# Patient Record
Sex: FEMALE | Race: WHITE | NOT HISPANIC OR LATINO | Employment: OTHER
[De-identification: names, ages, dates, MRNs, and addresses within clinical notes are randomized per-mention and may not be internally consistent; named-entity substitution may affect disease eponyms.]

---

## 2017-05-09 ENCOUNTER — RECORDS - HEALTHEAST (OUTPATIENT)
Dept: ADMINISTRATIVE | Facility: OTHER | Age: 72
End: 2017-05-09

## 2017-05-12 ENCOUNTER — OFFICE VISIT - HEALTHEAST (OUTPATIENT)
Dept: FAMILY MEDICINE | Facility: CLINIC | Age: 72
End: 2017-05-12

## 2017-05-12 DIAGNOSIS — M25.561 CHRONIC PAIN OF RIGHT KNEE: ICD-10-CM

## 2017-05-12 DIAGNOSIS — Z01.818 PREOPERATIVE EXAMINATION: ICD-10-CM

## 2017-05-12 DIAGNOSIS — G89.29 CHRONIC PAIN OF RIGHT KNEE: ICD-10-CM

## 2017-05-12 DIAGNOSIS — Z00.00 HEALTHCARE MAINTENANCE: ICD-10-CM

## 2017-05-12 LAB
ATRIAL RATE - MUSE: 80 BPM
CHOLEST SERPL-MCNC: 228 MG/DL
DIASTOLIC BLOOD PRESSURE - MUSE: NORMAL MMHG
FASTING STATUS PATIENT QL REPORTED: YES
HDLC SERPL-MCNC: 65 MG/DL
INTERPRETATION ECG - MUSE: NORMAL
LDLC SERPL CALC-MCNC: 142 MG/DL
P AXIS - MUSE: 41 DEGREES
PR INTERVAL - MUSE: 216 MS
QRS DURATION - MUSE: 84 MS
QT - MUSE: 396 MS
QTC - MUSE: 456 MS
R AXIS - MUSE: 7 DEGREES
SYSTOLIC BLOOD PRESSURE - MUSE: NORMAL MMHG
T AXIS - MUSE: 31 DEGREES
TRIGL SERPL-MCNC: 105 MG/DL
VENTRICULAR RATE- MUSE: 80 BPM

## 2017-05-12 ASSESSMENT — MIFFLIN-ST. JEOR: SCORE: 1086.89

## 2017-05-16 ENCOUNTER — COMMUNICATION - HEALTHEAST (OUTPATIENT)
Dept: FAMILY MEDICINE | Facility: CLINIC | Age: 72
End: 2017-05-16

## 2017-05-17 ENCOUNTER — COMMUNICATION - HEALTHEAST (OUTPATIENT)
Dept: FAMILY MEDICINE | Facility: CLINIC | Age: 72
End: 2017-05-17

## 2017-05-17 DIAGNOSIS — E78.49 OTHER HYPERLIPIDEMIA: ICD-10-CM

## 2017-05-31 ENCOUNTER — COMMUNICATION - HEALTHEAST (OUTPATIENT)
Dept: FAMILY MEDICINE | Facility: CLINIC | Age: 72
End: 2017-05-31

## 2017-06-03 ENCOUNTER — ANESTHESIA - HEALTHEAST (OUTPATIENT)
Dept: SURGERY | Facility: CLINIC | Age: 72
End: 2017-06-03

## 2017-06-05 ENCOUNTER — SURGERY - HEALTHEAST (OUTPATIENT)
Dept: SURGERY | Facility: CLINIC | Age: 72
End: 2017-06-05

## 2017-06-05 ASSESSMENT — MIFFLIN-ST. JEOR
SCORE: 1084.63
SCORE: 1082.36

## 2017-06-08 ENCOUNTER — COMMUNICATION - HEALTHEAST (OUTPATIENT)
Dept: FAMILY MEDICINE | Facility: CLINIC | Age: 72
End: 2017-06-08

## 2017-06-09 ENCOUNTER — OFFICE VISIT - HEALTHEAST (OUTPATIENT)
Dept: GERIATRICS | Facility: CLINIC | Age: 72
End: 2017-06-09

## 2017-06-09 DIAGNOSIS — M19.90 DEGENERATIVE JOINT DISEASE: ICD-10-CM

## 2017-06-09 DIAGNOSIS — Z96.651 HISTORY OF TOTAL KNEE ARTHROPLASTY, RIGHT: ICD-10-CM

## 2017-06-09 DIAGNOSIS — R52 PAIN MANAGEMENT: ICD-10-CM

## 2017-06-12 ENCOUNTER — AMBULATORY - HEALTHEAST (OUTPATIENT)
Dept: GERIATRICS | Facility: CLINIC | Age: 72
End: 2017-06-12

## 2017-06-12 ENCOUNTER — RECORDS - HEALTHEAST (OUTPATIENT)
Dept: ADMINISTRATIVE | Facility: OTHER | Age: 72
End: 2017-06-12

## 2017-06-13 ENCOUNTER — COMMUNICATION - HEALTHEAST (OUTPATIENT)
Dept: GERIATRICS | Facility: CLINIC | Age: 72
End: 2017-06-13

## 2017-06-13 ENCOUNTER — COMMUNICATION - HEALTHEAST (OUTPATIENT)
Dept: FAMILY MEDICINE | Facility: CLINIC | Age: 72
End: 2017-06-13

## 2017-06-13 DIAGNOSIS — M17.11 PRIMARY OSTEOARTHRITIS OF RIGHT KNEE: ICD-10-CM

## 2017-06-20 ENCOUNTER — RECORDS - HEALTHEAST (OUTPATIENT)
Dept: ADMINISTRATIVE | Facility: OTHER | Age: 72
End: 2017-06-20

## 2017-06-29 ENCOUNTER — COMMUNICATION - HEALTHEAST (OUTPATIENT)
Dept: FAMILY MEDICINE | Facility: CLINIC | Age: 72
End: 2017-06-29

## 2017-07-18 ENCOUNTER — RECORDS - HEALTHEAST (OUTPATIENT)
Dept: ADMINISTRATIVE | Facility: OTHER | Age: 72
End: 2017-07-18

## 2017-08-09 ENCOUNTER — RECORDS - HEALTHEAST (OUTPATIENT)
Dept: ADMINISTRATIVE | Facility: OTHER | Age: 72
End: 2017-08-09

## 2017-09-01 ENCOUNTER — OFFICE VISIT - HEALTHEAST (OUTPATIENT)
Dept: FAMILY MEDICINE | Facility: CLINIC | Age: 72
End: 2017-09-01

## 2017-09-01 DIAGNOSIS — E78.5 HYPERLIPIDEMIA: ICD-10-CM

## 2017-09-01 DIAGNOSIS — H69.90 EUSTACHIAN TUBE DYSFUNCTION: ICD-10-CM

## 2017-09-01 LAB
CHOLEST SERPL-MCNC: 229 MG/DL
FASTING STATUS PATIENT QL REPORTED: YES
HDLC SERPL-MCNC: 82 MG/DL
LDLC SERPL CALC-MCNC: 129 MG/DL
TRIGL SERPL-MCNC: 89 MG/DL

## 2017-09-05 ENCOUNTER — COMMUNICATION - HEALTHEAST (OUTPATIENT)
Dept: FAMILY MEDICINE | Facility: CLINIC | Age: 72
End: 2017-09-05

## 2017-09-08 ENCOUNTER — RECORDS - HEALTHEAST (OUTPATIENT)
Dept: ADMINISTRATIVE | Facility: OTHER | Age: 72
End: 2017-09-08

## 2017-09-29 ENCOUNTER — HOSPITAL ENCOUNTER (OUTPATIENT)
Dept: MAMMOGRAPHY | Facility: HOSPITAL | Age: 72
Discharge: HOME | End: 2017-09-29
Attending: FAMILY MEDICINE

## 2017-09-29 DIAGNOSIS — Z12.31 VISIT FOR SCREENING MAMMOGRAM: ICD-10-CM

## 2017-10-02 ENCOUNTER — HOSPITAL ENCOUNTER (OUTPATIENT)
Dept: MAMMOGRAPHY | Facility: HOSPITAL | Age: 72
Discharge: HOME | End: 2017-10-02
Attending: FAMILY MEDICINE

## 2017-10-02 DIAGNOSIS — N64.89 BREAST ASYMMETRY: ICD-10-CM

## 2018-02-21 ENCOUNTER — RECORDS - HEALTHEAST (OUTPATIENT)
Dept: ADMINISTRATIVE | Facility: OTHER | Age: 73
End: 2018-02-21

## 2018-02-22 ENCOUNTER — RECORDS - HEALTHEAST (OUTPATIENT)
Dept: ADMINISTRATIVE | Facility: OTHER | Age: 73
End: 2018-02-22

## 2018-02-27 ENCOUNTER — RECORDS - HEALTHEAST (OUTPATIENT)
Dept: ADMINISTRATIVE | Facility: OTHER | Age: 73
End: 2018-02-27

## 2018-03-25 ENCOUNTER — RECORDS - HEALTHEAST (OUTPATIENT)
Dept: ADMINISTRATIVE | Facility: OTHER | Age: 73
End: 2018-03-25

## 2018-03-28 ENCOUNTER — COMMUNICATION - HEALTHEAST (OUTPATIENT)
Dept: FAMILY MEDICINE | Facility: CLINIC | Age: 73
End: 2018-03-28

## 2018-03-28 DIAGNOSIS — I82.409 DVT (DEEP VENOUS THROMBOSIS) (H): ICD-10-CM

## 2018-05-08 ENCOUNTER — RECORDS - HEALTHEAST (OUTPATIENT)
Dept: ADMINISTRATIVE | Facility: OTHER | Age: 73
End: 2018-05-08

## 2018-05-18 ENCOUNTER — RECORDS - HEALTHEAST (OUTPATIENT)
Dept: ADMINISTRATIVE | Facility: OTHER | Age: 73
End: 2018-05-18

## 2018-05-23 ENCOUNTER — RECORDS - HEALTHEAST (OUTPATIENT)
Dept: ADMINISTRATIVE | Facility: OTHER | Age: 73
End: 2018-05-23

## 2018-05-30 ENCOUNTER — RECORDS - HEALTHEAST (OUTPATIENT)
Dept: ADMINISTRATIVE | Facility: OTHER | Age: 73
End: 2018-05-30

## 2018-06-04 ENCOUNTER — RECORDS - HEALTHEAST (OUTPATIENT)
Dept: ADMINISTRATIVE | Facility: OTHER | Age: 73
End: 2018-06-04

## 2018-07-27 ENCOUNTER — RECORDS - HEALTHEAST (OUTPATIENT)
Dept: ADMINISTRATIVE | Facility: OTHER | Age: 73
End: 2018-07-27

## 2018-07-30 ENCOUNTER — RECORDS - HEALTHEAST (OUTPATIENT)
Dept: ADMINISTRATIVE | Facility: OTHER | Age: 73
End: 2018-07-30

## 2018-09-19 ENCOUNTER — RECORDS - HEALTHEAST (OUTPATIENT)
Dept: ADMINISTRATIVE | Facility: OTHER | Age: 73
End: 2018-09-19

## 2018-09-27 ENCOUNTER — OFFICE VISIT - HEALTHEAST (OUTPATIENT)
Dept: RHEUMATOLOGY | Facility: CLINIC | Age: 73
End: 2018-09-27

## 2018-09-27 DIAGNOSIS — M15.0 PRIMARY GENERALIZED (OSTEO)ARTHRITIS: ICD-10-CM

## 2018-09-27 DIAGNOSIS — M25.511 CHRONIC RIGHT SHOULDER PAIN: ICD-10-CM

## 2018-09-27 DIAGNOSIS — G89.29 CHRONIC RIGHT SHOULDER PAIN: ICD-10-CM

## 2018-09-27 DIAGNOSIS — M15.0 PRIMARY OSTEOARTHRITIS INVOLVING MULTIPLE JOINTS: ICD-10-CM

## 2018-09-27 ASSESSMENT — MIFFLIN-ST. JEOR: SCORE: 1089.17

## 2018-10-01 ENCOUNTER — RECORDS - HEALTHEAST (OUTPATIENT)
Dept: ADMINISTRATIVE | Facility: OTHER | Age: 73
End: 2018-10-01

## 2018-10-10 ENCOUNTER — HOSPITAL ENCOUNTER (OUTPATIENT)
Dept: MAMMOGRAPHY | Facility: CLINIC | Age: 73
Discharge: HOME | End: 2018-10-10

## 2018-10-10 DIAGNOSIS — Z12.31 VISIT FOR SCREENING MAMMOGRAM: ICD-10-CM

## 2018-10-22 ENCOUNTER — COMMUNICATION - HEALTHEAST (OUTPATIENT)
Dept: ADMINISTRATIVE | Facility: CLINIC | Age: 73
End: 2018-10-22

## 2018-10-23 ENCOUNTER — OFFICE VISIT - HEALTHEAST (OUTPATIENT)
Dept: RHEUMATOLOGY | Facility: CLINIC | Age: 73
End: 2018-10-23

## 2018-10-23 DIAGNOSIS — G89.29 CHRONIC RIGHT SHOULDER PAIN: ICD-10-CM

## 2018-10-23 DIAGNOSIS — M75.81 ROTATOR CUFF TENDINITIS, RIGHT: ICD-10-CM

## 2018-10-23 DIAGNOSIS — M25.511 CHRONIC RIGHT SHOULDER PAIN: ICD-10-CM

## 2019-05-20 ENCOUNTER — OFFICE VISIT - HEALTHEAST (OUTPATIENT)
Dept: RHEUMATOLOGY | Facility: CLINIC | Age: 74
End: 2019-05-20

## 2019-05-20 DIAGNOSIS — M75.21 TENDONITIS OF LONG HEAD OF BICEPS BRACHII OF RIGHT SHOULDER: ICD-10-CM

## 2019-05-20 DIAGNOSIS — M25.511 ACUTE PAIN OF RIGHT SHOULDER: ICD-10-CM

## 2019-08-28 ENCOUNTER — RECORDS - HEALTHEAST (OUTPATIENT)
Dept: ADMINISTRATIVE | Facility: OTHER | Age: 74
End: 2019-08-28

## 2019-09-06 ENCOUNTER — RECORDS - HEALTHEAST (OUTPATIENT)
Dept: ADMINISTRATIVE | Facility: OTHER | Age: 74
End: 2019-09-06

## 2019-09-26 ENCOUNTER — RECORDS - HEALTHEAST (OUTPATIENT)
Dept: ADMINISTRATIVE | Facility: OTHER | Age: 74
End: 2019-09-26

## 2019-10-17 ENCOUNTER — RECORDS - HEALTHEAST (OUTPATIENT)
Dept: ADMINISTRATIVE | Facility: OTHER | Age: 74
End: 2019-10-17

## 2020-06-03 ENCOUNTER — OFFICE VISIT - HEALTHEAST (OUTPATIENT)
Dept: FAMILY MEDICINE | Facility: CLINIC | Age: 75
End: 2020-06-03

## 2020-06-03 DIAGNOSIS — Z12.31 VISIT FOR SCREENING MAMMOGRAM: ICD-10-CM

## 2020-06-03 DIAGNOSIS — I10 BENIGN ESSENTIAL HYPERTENSION: ICD-10-CM

## 2020-06-03 DIAGNOSIS — M94.9 DISORDER OF BONE AND CARTILAGE: ICD-10-CM

## 2020-06-03 DIAGNOSIS — M89.9 DISORDER OF BONE AND CARTILAGE: ICD-10-CM

## 2020-06-03 DIAGNOSIS — E78.00 PURE HYPERCHOLESTEROLEMIA: ICD-10-CM

## 2020-06-03 DIAGNOSIS — Z78.0 MENOPAUSE: ICD-10-CM

## 2020-06-08 ENCOUNTER — AMBULATORY - HEALTHEAST (OUTPATIENT)
Dept: SCHEDULING | Facility: CLINIC | Age: 75
End: 2020-06-08

## 2020-06-08 DIAGNOSIS — M85.80 LOW BONE MASS: ICD-10-CM

## 2020-06-08 DIAGNOSIS — Z78.0 MENOPAUSE: ICD-10-CM

## 2020-06-16 ENCOUNTER — HOSPITAL ENCOUNTER (OUTPATIENT)
Dept: MAMMOGRAPHY | Facility: CLINIC | Age: 75
Discharge: HOME | End: 2020-06-16
Attending: FAMILY MEDICINE

## 2020-06-16 ENCOUNTER — AMBULATORY - HEALTHEAST (OUTPATIENT)
Dept: NURSING | Facility: CLINIC | Age: 75
End: 2020-06-16

## 2020-06-16 ENCOUNTER — AMBULATORY - HEALTHEAST (OUTPATIENT)
Dept: LAB | Facility: CLINIC | Age: 75
End: 2020-06-16

## 2020-06-16 DIAGNOSIS — E78.00 PURE HYPERCHOLESTEROLEMIA: ICD-10-CM

## 2020-06-16 DIAGNOSIS — I10 BENIGN ESSENTIAL HYPERTENSION: ICD-10-CM

## 2020-06-16 DIAGNOSIS — Z12.31 VISIT FOR SCREENING MAMMOGRAM: ICD-10-CM

## 2020-06-16 DIAGNOSIS — M89.9 DISORDER OF BONE AND CARTILAGE: ICD-10-CM

## 2020-06-16 DIAGNOSIS — M94.9 DISORDER OF BONE AND CARTILAGE: ICD-10-CM

## 2020-06-16 LAB
ALBUMIN SERPL-MCNC: 4.2 G/DL (ref 3.5–5)
ALP SERPL-CCNC: 57 U/L (ref 45–120)
ALT SERPL W P-5'-P-CCNC: 20 U/L (ref 0–45)
ANION GAP SERPL CALCULATED.3IONS-SCNC: 12 MMOL/L (ref 5–18)
AST SERPL W P-5'-P-CCNC: 22 U/L (ref 0–40)
BILIRUB SERPL-MCNC: 0.6 MG/DL (ref 0–1)
BUN SERPL-MCNC: 13 MG/DL (ref 8–28)
CALCIUM SERPL-MCNC: 10.1 MG/DL (ref 8.5–10.5)
CHLORIDE BLD-SCNC: 105 MMOL/L (ref 98–107)
CHOLEST SERPL-MCNC: 226 MG/DL
CO2 SERPL-SCNC: 23 MMOL/L (ref 22–31)
CREAT SERPL-MCNC: 0.74 MG/DL (ref 0.6–1.1)
ERYTHROCYTE [DISTWIDTH] IN BLOOD BY AUTOMATED COUNT: 12.9 % (ref 11–14.5)
FASTING STATUS PATIENT QL REPORTED: YES
GFR SERPL CREATININE-BSD FRML MDRD: >60 ML/MIN/1.73M2
GLUCOSE BLD-MCNC: 92 MG/DL (ref 70–125)
HCT VFR BLD AUTO: 41.6 % (ref 35–47)
HDLC SERPL-MCNC: 81 MG/DL
HGB BLD-MCNC: 14 G/DL (ref 12–16)
LDLC SERPL CALC-MCNC: 116 MG/DL
MCH RBC QN AUTO: 32.2 PG (ref 27–34)
MCHC RBC AUTO-ENTMCNC: 33.7 G/DL (ref 32–36)
MCV RBC AUTO: 96 FL (ref 80–100)
PLATELET # BLD AUTO: 292 THOU/UL (ref 140–440)
PMV BLD AUTO: 7.4 FL (ref 7–10)
POTASSIUM BLD-SCNC: 4.6 MMOL/L (ref 3.5–5)
PROT SERPL-MCNC: 7 G/DL (ref 6–8)
RBC # BLD AUTO: 4.34 MILL/UL (ref 3.8–5.4)
SODIUM SERPL-SCNC: 140 MMOL/L (ref 136–145)
TRIGL SERPL-MCNC: 144 MG/DL
WBC: 6.6 THOU/UL (ref 4–11)

## 2020-06-18 LAB
25(OH)D3 SERPL-MCNC: 23.6 NG/ML (ref 30–80)
25(OH)D3 SERPL-MCNC: 23.6 NG/ML (ref 30–80)

## 2020-06-19 ENCOUNTER — COMMUNICATION - HEALTHEAST (OUTPATIENT)
Dept: FAMILY MEDICINE | Facility: CLINIC | Age: 75
End: 2020-06-19

## 2020-07-10 ENCOUNTER — OFFICE VISIT - HEALTHEAST (OUTPATIENT)
Dept: FAMILY MEDICINE | Facility: CLINIC | Age: 75
End: 2020-07-10

## 2020-07-10 DIAGNOSIS — Z86.711 HISTORY OF PULMONARY EMBOLISM: ICD-10-CM

## 2020-07-10 DIAGNOSIS — M89.9 DISORDER OF BONE AND CARTILAGE: ICD-10-CM

## 2020-07-10 DIAGNOSIS — I10 BENIGN ESSENTIAL HYPERTENSION: ICD-10-CM

## 2020-07-10 DIAGNOSIS — Z85.828 HISTORY OF BASAL CELL CARCINOMA: ICD-10-CM

## 2020-07-10 DIAGNOSIS — M94.9 DISORDER OF BONE AND CARTILAGE: ICD-10-CM

## 2020-07-10 DIAGNOSIS — E78.00 PURE HYPERCHOLESTEROLEMIA: ICD-10-CM

## 2020-07-10 DIAGNOSIS — Z00.00 ROUTINE GENERAL MEDICAL EXAMINATION AT A HEALTH CARE FACILITY: ICD-10-CM

## 2020-07-10 ASSESSMENT — PATIENT HEALTH QUESTIONNAIRE - PHQ9: SUM OF ALL RESPONSES TO PHQ QUESTIONS 1-9: 0

## 2020-07-14 ENCOUNTER — COMMUNICATION - HEALTHEAST (OUTPATIENT)
Dept: FAMILY MEDICINE | Facility: CLINIC | Age: 75
End: 2020-07-14

## 2020-07-20 ENCOUNTER — RECORDS - HEALTHEAST (OUTPATIENT)
Dept: ADMINISTRATIVE | Facility: OTHER | Age: 75
End: 2020-07-20

## 2020-08-20 ENCOUNTER — COMMUNICATION - HEALTHEAST (OUTPATIENT)
Dept: FAMILY MEDICINE | Facility: CLINIC | Age: 75
End: 2020-08-20

## 2020-08-20 ENCOUNTER — AMBULATORY - HEALTHEAST (OUTPATIENT)
Dept: FAMILY MEDICINE | Facility: CLINIC | Age: 75
End: 2020-08-20

## 2020-08-20 DIAGNOSIS — I10 BENIGN ESSENTIAL HYPERTENSION: ICD-10-CM

## 2020-09-03 ENCOUNTER — OFFICE VISIT - HEALTHEAST (OUTPATIENT)
Dept: INTERNAL MEDICINE | Facility: CLINIC | Age: 75
End: 2020-09-03

## 2020-09-03 DIAGNOSIS — M85.80 OSTEOPENIA WITH HIGH RISK OF FRACTURE: ICD-10-CM

## 2020-09-03 DIAGNOSIS — M85.88 OSTEOPENIA OF OTHER SITE: ICD-10-CM

## 2020-09-04 ENCOUNTER — AMBULATORY - HEALTHEAST (OUTPATIENT)
Dept: NURSING | Facility: CLINIC | Age: 75
End: 2020-09-04

## 2020-10-02 ENCOUNTER — RECORDS - HEALTHEAST (OUTPATIENT)
Dept: ADMINISTRATIVE | Facility: OTHER | Age: 75
End: 2020-10-02

## 2020-10-30 ENCOUNTER — COMMUNICATION - HEALTHEAST (OUTPATIENT)
Dept: FAMILY MEDICINE | Facility: CLINIC | Age: 75
End: 2020-10-30

## 2020-10-30 DIAGNOSIS — Z11.52 ENCOUNTER FOR SCREENING LABORATORY TESTING FOR COVID-19 VIRUS: ICD-10-CM

## 2020-11-06 ENCOUNTER — NURSE TRIAGE (OUTPATIENT)
Dept: NURSING | Facility: CLINIC | Age: 75
End: 2020-11-06

## 2020-11-06 ENCOUNTER — COMMUNICATION - HEALTHEAST (OUTPATIENT)
Dept: SCHEDULING | Facility: CLINIC | Age: 75
End: 2020-11-06

## 2020-11-06 NOTE — TELEPHONE ENCOUNTER
Called to leave a message with anyone in her primary care provider's team regarding an order for COVID-19 infection testing prior to a procedure next Friday (11/13).  This triage nurse have no access to  providers, call was transferred to an  nurse advisor to assist with this.  Jaki Camargo RN    Reason for Disposition    [1] Caller requesting NON-URGENT health information AND [2] PCP's office is the best resource    Additional Information    Negative: [1] Caller is not with the adult (patient) AND [2] reporting urgent symptoms    Negative: Lab result questions    Negative: Medication questions    Negative: Caller can't be reached by phone    Negative: Caller has already spoken to PCP or another triager    Negative: RN needs further essential information from caller in order to complete triage    Negative: Requesting regular office appointment    Protocols used: INFORMATION ONLY CALL-A-

## 2020-11-14 ENCOUNTER — AMBULATORY - HEALTHEAST (OUTPATIENT)
Dept: FAMILY MEDICINE | Facility: CLINIC | Age: 75
End: 2020-11-14

## 2020-11-14 DIAGNOSIS — Z11.52 ENCOUNTER FOR SCREENING LABORATORY TESTING FOR COVID-19 VIRUS: ICD-10-CM

## 2020-11-16 ENCOUNTER — COMMUNICATION - HEALTHEAST (OUTPATIENT)
Dept: SCHEDULING | Facility: CLINIC | Age: 75
End: 2020-11-16

## 2021-01-08 ENCOUNTER — COMMUNICATION - HEALTHEAST (OUTPATIENT)
Dept: FAMILY MEDICINE | Facility: CLINIC | Age: 76
End: 2021-01-08

## 2021-01-08 DIAGNOSIS — I10 BENIGN ESSENTIAL HYPERTENSION: ICD-10-CM

## 2021-05-27 ENCOUNTER — RECORDS - HEALTHEAST (OUTPATIENT)
Dept: ADMINISTRATIVE | Facility: CLINIC | Age: 76
End: 2021-05-27

## 2021-05-27 VITALS — SYSTOLIC BLOOD PRESSURE: 128 MMHG | DIASTOLIC BLOOD PRESSURE: 79 MMHG | HEART RATE: 69 BPM

## 2021-05-27 VITALS — SYSTOLIC BLOOD PRESSURE: 132 MMHG | DIASTOLIC BLOOD PRESSURE: 88 MMHG | HEART RATE: 66 BPM

## 2021-05-27 VITALS — DIASTOLIC BLOOD PRESSURE: 99 MMHG | SYSTOLIC BLOOD PRESSURE: 155 MMHG | OXYGEN SATURATION: 97 % | HEART RATE: 83 BPM

## 2021-05-27 ASSESSMENT — PATIENT HEALTH QUESTIONNAIRE - PHQ9: SUM OF ALL RESPONSES TO PHQ QUESTIONS 1-9: 0

## 2021-05-28 ENCOUNTER — RECORDS - HEALTHEAST (OUTPATIENT)
Dept: ADMINISTRATIVE | Facility: CLINIC | Age: 76
End: 2021-05-28

## 2021-05-28 NOTE — PROGRESS NOTES
ASSESSMENT AND PLAN:  Ivis Cook 73 y.o. female is seen here on 05/20/19 for evaluation of right shoulder pain consistent with tendinopathy of the long head of biceps, she would like to proceed with local injection pros and cons of which were outlined including the risk of tear of the tendon and its attendant consequences.  Under ultrasound guidance 20 mg of Kenalog injected the tendon sheath of the long head of the biceps on the right side.  She tolerated the procedure well and had a brisk Marcaine effect.  She is to follow-up here in 4 months or sooner.  Diagnoses and all orders for this visit:    Tendonitis of long head of biceps brachii of right shoulder  -     Discontinue: methylPREDNISolone acetate injection 20 mg (DEPO-MEDROL)  -     triamcinolone acetonide 40 mg/mL injection 20 mg (KENALOG-40)    Acute pain of right shoulder           HISTORY OF PRESENTING ILLNESS ON 05/20/19 :  Ivis Cook 73 y.o. is here for a moderately severe flare up of pain. Here for a moderately severe flare up of pain.  Joints affected include the left shoulder(s). This has gone on for several weeks. Pain is described as sharp. It is worse with activity at times bedtime..  Her symptoms are moderately severe. The symptoms are progressive.  Associated findings include /do not include: swelling, rash.  There is no associated recent fall or trauma.  Over-the-counter treatment to date has been without significant relief.  The pain is worse in flexion, occasionally she is woken up from sleep, and she does not have any limitation of movements that involve internal rotation.  Further historical information, including ROS and limitation in activities as noted in the multidimensional health assessment questionnaire scanned in the EMR and in the assessment and plan section.    ALLERGIES:Oxycontin [oxycodone]    PAST MEDICAL/ACTIVE PROBLEMS/MEDICATION/SOCIAL DATA  Past Medical History:   Diagnosis Date     Basal cell carcinoma of hand  2013     Basal cell carcinoma of leg 2012     Generalized osteoarthritis      Osteopenia      Synovial cyst popliteal space     Left      Social History     Tobacco Use   Smoking Status Never Smoker   Smokeless Tobacco Never Used     Patient Active Problem List   Diagnosis     Spondylosis     Osteopenia     Hammer Toe     Serum Total Cholesterol Was Elevated     Soft Tissue Pain In Lower Extremities     Contusion With Intact Skin Surface     Menopause Has Occurred     Spondylolisthesis     Groin (Inguinal) Pain     Synovial Cyst Of Popliteal Space     Osteoarthritis Of The Knee     Post-traumatic osteoarthritis of both knees     Meniscal injury, left, subsequent encounter 18months ago     Osteoarthritis of right knee     DVT (deep venous thrombosis) (H)     Primary osteoarthritis involving multiple joints     Chronic right shoulder pain     Rotator cuff tendinitis, right     Current Outpatient Medications   Medication Sig Dispense Refill     cholecalciferol, vitamin D3, 400 unit Tab Take 400 Units by mouth daily.       diphenhydrAMINE-acetaminophen (TYLENOL PM EXTRA STRENGTH)  mg Tab Take 1 tablet by mouth at bedtime as needed.       esomeprazole (NEXIUM) 40 MG capsule Take 1 capsule by mouth.       omega 3-dha-epa-fish oil (FISH OIL) 1,000 mg (120 mg-180 mg) cap Take by mouth.       POTASSIUM ORAL Take 1 tablet by mouth daily. otc med for leg cramps       rivaroxaban (XARELTO) 20 mg Tab Take by mouth.       No current facility-administered medications for this visit.          DETAILED EXAMINATION  05/20/19  :  Vitals:    05/20/19 1317   BP: 112/60   Patient Site: Right Arm   Patient Position: Sitting   Cuff Size: Adult Regular   Pulse: 92   Weight: 143 lb (64.9 kg)     Alert oriented. Head including the face is examined for malar rash, heliotropes, scarring, lupus pernio. Eyes examined for redness such as in episcleritis/scleritis, periorbital lesions.   Neck/ Face examined for parotid gland swelling, range  of motion of neck.  Left upper and lower and right upper and lower extremities examined for tenderness, swelling, warmth of the appendicular joints, range of motion, edema, rash.  Some of the important findings included: Full range of motion in all planes flexion reproduces the symptoms for her with anterior tenderness, internal rotation is full.           LAB / IMAGING DATA:  ALT   Date Value Ref Range Status   05/12/2017 20 0 - 45 U/L Final     Albumin   Date Value Ref Range Status   05/12/2017 4.1 3.5 - 5.0 g/dL Final     Creatinine   Date Value Ref Range Status   06/05/2017 0.73 0.60 - 1.10 mg/dL Final   05/12/2017 0.70 0.60 - 1.10 mg/dL Final   05/20/2014 0.73 0.60 - 1.10 mg/dL Final       WBC   Date Value Ref Range Status   06/05/2017 5.4 4.0 - 11.0 thou/uL Final   05/12/2017 5.9 4.0 - 11.0 thou/uL Final     Hemoglobin   Date Value Ref Range Status   06/07/2017 11.3 (L) 12.0 - 16.0 g/dL Final   06/06/2017 11.7 (L) 12.0 - 16.0 g/dL Final   06/05/2017 14.2 12.0 - 16.0 g/dL Final     Platelets   Date Value Ref Range Status   06/05/2017 267 140 - 440 thou/uL Final   05/12/2017 293 140 - 440 thou/uL Final   09/14/2010 274 140 - 440 thou/uL Final       Lab Results   Component Value Date    RF <15 05/05/2014

## 2021-05-30 VITALS — WEIGHT: 143 LBS | HEIGHT: 62 IN | BODY MASS INDEX: 26.31 KG/M2

## 2021-05-30 VITALS — BODY MASS INDEX: 24.54 KG/M2 | HEIGHT: 63 IN | WEIGHT: 138.5 LBS

## 2021-05-31 VITALS — BODY MASS INDEX: 24.83 KG/M2 | WEIGHT: 140.19 LBS

## 2021-05-31 VITALS — WEIGHT: 140.5 LBS | BODY MASS INDEX: 24.89 KG/M2

## 2021-06-01 ENCOUNTER — RECORDS - HEALTHEAST (OUTPATIENT)
Dept: ADMINISTRATIVE | Facility: CLINIC | Age: 76
End: 2021-06-01

## 2021-06-01 VITALS — HEIGHT: 63 IN | WEIGHT: 140 LBS | BODY MASS INDEX: 24.8 KG/M2

## 2021-06-02 VITALS — BODY MASS INDEX: 25.33 KG/M2 | WEIGHT: 143 LBS

## 2021-06-02 VITALS — WEIGHT: 140 LBS | BODY MASS INDEX: 24.8 KG/M2

## 2021-06-04 VITALS
HEART RATE: 85 BPM | OXYGEN SATURATION: 97 % | BODY MASS INDEX: 25.79 KG/M2 | SYSTOLIC BLOOD PRESSURE: 118 MMHG | WEIGHT: 145.6 LBS | DIASTOLIC BLOOD PRESSURE: 64 MMHG

## 2021-06-08 NOTE — PATIENT INSTRUCTIONS - HE
Start Lisinopril 5 mg a day.     Nurse appointment in 2 weeks for BP check -CA to help set    Lab appointment same time in an am    Physical, we will set for Wed Aug 12 at 3:20.    Colonoscopy due 2023.    Dexa scan and mammogram ordered.    Mail AVS.

## 2021-06-08 NOTE — PROGRESS NOTES
Blood pressure looks good on new medication.  Continue same dose.  I will see her for her physical in August.
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
## 2021-06-08 NOTE — PROGRESS NOTES
Left message to call back for: BP   Information to relay to patient:  Left message on VM with below recommendations.

## 2021-06-09 NOTE — TELEPHONE ENCOUNTER
Left detailed message with medical records.   We do have a scanned consult note from 10/1/2018. Asked that any other records/lab results be faxed also.

## 2021-06-09 NOTE — TELEPHONE ENCOUNTER
Messages below relayed to pt . She is very appreciative of this information from Dr. Martinez .     Closing this encounter

## 2021-06-09 NOTE — PROGRESS NOTES
"Ivis Cook is a 74 y.o. female who is being evaluated via a billable video visit.      The patient has been notified of following:     \"This video visit will be conducted via a call between you and your physician/provider. We have found that certain health care needs can be provided without the need for an in-person physical exam.  This service lets us provide the care you need with a video conversation.  If a prescription is necessary we can send it directly to your pharmacy.  If lab work is needed we can place an order for that and you can then stop by our lab to have the test done at a later time.    Video visits are billed at different rates depending on your insurance coverage. Please reach out to your insurance provider with any questions.    If during the course of the call the physician/provider feels a video visit is not appropriate, you will not be charged for this service.\"    Patient has given verbal consent to a Video visit? Yes  How would you like to obtain your AVS? AVS Preference: [place]. And mail  Patient would like the video invitation sent by: Text to cell phone: [phone]  Will anyone else be joining your video visit? No        Video Start Time: 11:59 AM    Additional provider notes:   See Medicare annual wellness notes    Video-Visit Details    Type of service:  Video Visit    Video End Time (time video stopped): 12:32 PM  Originating Location (pt. Location): Home    Distant Location (provider location):  [facility]     Platform used for Video Visit: Kristi Martinez MD    Assessment and Plan:     1. Routine general medical examination at a health care facility     2. Serum Total Cholesterol Was Elevated  rosuvastatin (CRESTOR) 5 MG tablet   3. History of pulmonary embolism  Ambulatory referral to Oncology/Hematology Adult   4. History of basal cell carcinoma     5. Osteopenia     6. Benign essential hypertension       Check with insurance on " converage of Tdap and Shingryx shots    Need Prevnar 13 shot    These 3 shots were ordered for our clinic but should check with insurance on coverage first.    Appointment with osteoporosis clinic in sept. For low bone mass and high risk of fracture on DEXA in June 2020.    Follow up in 6 months for med check    Star Crestor 5 mg at bedtime.  This is a cholesterol medication to help reduce cholesterol as well as reduce your risk of heart attack and stroke.  Per our risk calculator you do have a 25% risk of heart attack and stroke.    You will follow-up with [facility] to discuss whether or not the Xarelto is to be long lifelong or not for her.  We will get the lab results that were done there for our review.    With history of skin cancer should see dermatologist yearly.  This was not discussed with the patient but we will point this out in the after visit summary.    The patient's current medical problems were reviewed.    I have had an Advance Directives discussion with the patient.  The following health maintenance schedule was reviewed with the patient and provided in printed form in the after visit summary:   Health Maintenance   Topic Date Due     ADVANCE CARE PLANNING  On file     MEDICARE ANNUAL WELLNESS VISIT  Today     ZOSTER VACCINES (2 of 3) If you are interested in the new shingles shot, Shingrix, please check with insurance for coverage either in clinic or at a pharmacy. It is a 2 shot series 2-6 months apart.      PNEUMOCOCCAL IMMUNIZATION 65+ LOW/MEDIUM RISK (2 of 2 - PCV13) Ordered     TD 18+ HE  Due, ordered, will check with insurance     FALL RISK ASSESSMENT  Discussed and steady     INFLUENZA VACCINE RULE BASED (1) 08/01/2020     DXA SCAN  06/16/2022     LIPID  06/16/2025      Mammogram-Due June 2021  Colonocopy due 2023, as long as no blood in stool, no change in bowel habits, no abdominal pain, or family history of colon cancer or adenomatous colon polyps    Subjective:    Chief Complaint: Ivis Cook is an 74 y.o. female here for an Annual Wellness visit.   HPI:      Hypertension:  BP on own  126/77.  Does not complain of vision change, chest pain or shortness of breath.    Osteopenia with high risk of fracture:  Questions why she needs to see specialist.  Did discuss with the patient in detail her recent bone density scan that showed low bone mass and a high risk of fracture as well as a large decline from the past bone density scan.  With this explanation she is agreeable to see the osteoporosis specialist.    Hyperlipidemia:    The 10-year ASCVD risk score (Thor AZIZA Jr., et al., 2013) is: 25.2%    Values used to calculate the score:      Age: 74 years      Sex: Female      Is Non- : No      Diabetic: No      Tobacco smoker: No      Systolic Blood Pressure: 132 mmHg      Is BP treated: Yes      HDL Cholesterol: 81 mg/dL      Total Cholesterol: 226 mg/dL      History of pulmonary embolism and DVT.  This was in 2018.  She is currently on Xarelto.  She would like to get off this medication if possible.  She did see [facility] but per their notes it was not clear if she was to be on this lifelong.  She saw them in September 2019 and they recommended continuing it at that time.  She is agreeable to have another visit with them.      Review of Systems:    Please see above.  The rest of the review of systems are negative for all systems.    Patient Care Team:  Addie Martinez MD as PCP - General  Caitlin Barragan FNP as Assigned PCP     Patient Active Problem List   Diagnosis     Spondylosis     Osteopenia     Hammer Toe     Serum Total Cholesterol Was Elevated     Soft Tissue Pain In Lower Extremities     Contusion With Intact Skin Surface     Menopause Has Occurred     Spondylolisthesis     Groin (Inguinal) Pain     Synovial Cyst Of Popliteal Space     Osteoarthritis Of The Knee     Post-traumatic osteoarthritis of both knees     Meniscal  injury, left, subsequent encounter 18months ago     Osteoarthritis of right knee     Primary osteoarthritis involving multiple joints     Chronic right shoulder pain     Rotator cuff tendinitis, right     Acute pain of right shoulder     Benign essential hypertension     History of basal cell carcinoma     Past Medical History:   Diagnosis Date     Basal cell carcinoma of hand 2013     Basal cell carcinoma of leg 2012     DVT (deep venous thrombosis) (H) 03/29/2018    Not seen but assumed since PE     Eosinophilic esophagitis      Generalized osteoarthritis      Osteopenia      Pulmonary emboli (H) 03/2018     Synovial cyst popliteal space     Left       Past Surgical History:   Procedure Laterality Date     HYSTERECTOMY  1993     left knee arthroplasty Left 01/2017     OOPHORECTOMY Bilateral 1993     NJ APPENDECTOMY      Description: Appendectomy;  Recorded: 08/24/2009;  Comments: 1967     NJ ARTHRODESIS ANT INTERBODY MIN DISCECTOMY,LUMBAR      Description: Lumbar Vertebral Fusion;  Recorded: 08/24/2009;  Comments: L5-S1 with laminectomy x 2-1976 and 1977     NJ CORRJ HALLUX VALGUS W/SESMDC W/RESCJ PROX PHAL      Description: Bunion Correction By Cheilectomy;  Recorded: 08/24/2009;  Comments: Padmini-1985     NJ EXCISE HAND/FOOT NEUROMA      Description: Excision Of Neuroma Of Foot;  Recorded: 08/24/2009;  Comments: Bilat-1987     NJ REMOVAL OF TONSILS,<11 Y/O      Description: Tonsillectomy;  Recorded: 08/24/2009;  Comments: Age 3     NJ TOTAL KNEE ARTHROPLASTY Right 6/5/2017    Procedure: RIGHT TOTAL KNEE ARTHROPLASTY;  Surgeon: Donaldo Ron DO;  Location: [facility];  Service: Orthopedics      Family History   Problem Relation Age of Onset     Lung cancer Father      Stroke Mother       Social History     Socioeconomic History     Marital status:      Spouse name: Not on file     Number of children: Not on file     Years of education: Not on file     Highest education level: Not on file    Occupational History     Not on file   Social Needs     Financial resource strain: Not on file     Food insecurity     Worry: Not on file     Inability: Not on file     Transportation needs     Medical: Not on file     Non-medical: Not on file   Tobacco Use     Smoking status: Never Smoker     Smokeless tobacco: Never Used   Substance and Sexual Activity     Alcohol use: Yes     Alcohol/week: 3.3 standard drinks     Types: 4 Standard drinks or equivalent per week     Comment: Occ     Drug use: No     Sexual activity: Never   Lifestyle     Physical activity     Days per week: Not on file     Minutes per session: Not on file     Stress: Not on file   Relationships     Social connections     Talks on phone: Not on file     Gets together: Not on file     Attends Zoroastrianism service: Not on file     Active member of club or organization: Not on file     Attends meetings of clubs or organizations: Not on file     Relationship status: Not on file     Intimate partner violence     Fear of current or ex partner: Not on file     Emotionally abused: Not on file     Physically abused: Not on file     Forced sexual activity: Not on file   Other Topics Concern     Not on file   Social History Narrative     Not on file      Current Outpatient Medications   Medication Sig Dispense Refill     cholecalciferol, vitamin D3, 400 unit Tab Take 400 Units by mouth daily.       diphenhydrAMINE-acetaminophen (TYLENOL PM EXTRA STRENGTH)  mg Tab Take 1 tablet by mouth at bedtime as needed.       esomeprazole (NEXIUM) 20 MG capsule        lisinopriL (PRINIVIL,ZESTRIL) 5 MG tablet Take 1 tablet (5 mg total) by mouth daily. 90 tablet 3     magnesium 30 mg tablet Take 30 mg by mouth 2 (two) times a day.       omega 3-dha-epa-fish oil (FISH OIL) 1,000 mg (120 mg-180 mg) cap Take by mouth.       POTASSIUM ORAL Take 1 tablet by mouth daily. otc med for leg cramps       rivaroxaban (XARELTO) 20 mg Tab Take by mouth.       rosuvastatin (CRESTOR)  5 MG tablet Take 1 tablet (5 mg total) by mouth at bedtime. 90 tablet 3     No current facility-administered medications for this visit.       Objective:   Vital Signs: There were no vitals taken for this visit.     Weight: Unable to obtain due to video visit  Height: Unable to obtain due to video visit  BMI: Unable to obtain due to video visit  Blood Pressure: Provided by patient      VisionScreening:  No exam data present     PHYSICAL EXAM  General: Patient appears healthy on video    Assessment Results 7/10/2020   Activities of Daily Living No help needed   Instrumental Activities of Daily Living No help needed   Some recent data might be hidden     A Mini-Cog score of 0-2 suggests the possibility of dementia, score of 3-5 suggests no dementia  Falls risj  k completed and patient steady on her feet  Cognitive screen completed and 5 out of 5, clock drawing test 2 out of 2    Identified Health Risks:     The patient reports that she does not have all recommended working emergency equipment available. She was provided with information about emergency preparedness, including smoke detectors.  She is at risk for falling and has been provided with information to reduce the risk of falling at home.  Patient's advanced directive was discussed and I am comfortable with the patient's wishes.

## 2021-06-09 NOTE — TELEPHONE ENCOUNTER
I am fine with her doing a virtual visit with them in September.  She should be on calcium 600 mg twice a day with food and vitamin D 1000 units daily.

## 2021-06-09 NOTE — TELEPHONE ENCOUNTER
----- Message from Addie Martinez MD sent at 7/12/2020  9:00 PM CDT -----  Please get all of the labs that were done at [facility].  They would have been done with in the last 2 years.  Thank you. Katherine

## 2021-06-09 NOTE — TELEPHONE ENCOUNTER
See results below:  Looks like high risk of fracture and large change in bone mass since last DEXA scan.  I would recommend the consult to discuss possible tretaament.    Results:  74 y.o. female with LOW BONE DENSITY (OSTEOPENIA) and HIGH fracture risk, adjusted for the TBS, with major osteoporotic fracture risk 12.7 % and hip fracture risk 3.2 %.  According to the World Health Organization, a hip fracture risk greater than 3.0% can be an indication for evaluation and treatment of low bone mass.     Since the previous bone density dated  May 8, 2014,  there has been a -11.4 % change in the left total hip and a -5.8 % change in the right total hip.     Recommendations:  Appropriate calcium, vitamin D supplements, along with balance and weight bearing exercise recommended.  Additionally, the consideration for evaluation and treatment of low bone mass with elevated FRAX is recommended with follow up bone density scan in 2 years.

## 2021-06-09 NOTE — TELEPHONE ENCOUNTER
The reason to visit with an osteoporosis specialist to decide if oral treatment or injectable treatment is needed at this point to prevent breaking a bone.

## 2021-06-09 NOTE — TELEPHONE ENCOUNTER
Upcoming Appointment Question  When is the appointment: 9/3/20  What is your appointment for?: Osteoporosis clinic  Who is your appointment scheduled with?: Dr. Burden  What is your question/concern?: The patient is requesting more information about the appointment with  and why she needs to go to an  osteo provider?  Okay to leave a detailed message?: Yes

## 2021-06-09 NOTE — TELEPHONE ENCOUNTER
Spoke with pt and notified her of message below. Pt stated she cannot get into osteoporosis clinic until Sept and again doesn't understand why she should follow as she has been told its progressive. Should she increase dosing of calcium or vitamin D for now? Is she ok to not follow with them?

## 2021-06-09 NOTE — TELEPHONE ENCOUNTER
Spoke with patient and reviewed Dr Martinez's notes on the dexa scan. She wants a more detailed answer with why she needs to see the specialist. Has there been a significant change? What does Dr Martinez think they will do for her?   States she has met with specialist before and has been told nothing can be done.

## 2021-06-10 NOTE — PROGRESS NOTES
Assessment/Plan:      Visit for Preoperative Exam.     71 year old female with a past medical history of osteoarthritis and multiple orthopedic surgeries in the past who is here for a preoperative examination prior to undergoing right total knee replacement.  Patient has discussed risks, benefits, alternative therapy with her surgeon Dr. Ron.  He appears well on examination today with normal vital signs normal cardiopulmonary, abdominal, neurologic examination.  Patient does not have a history of cardiovascular pulmonary or kidney disease.  She has no history of diabetes. Activity level >4 METS.  She has no chest pain or shortness of breath on exertion.  She does not have a history of anesthesia intolerance, bleeding or clotting disorder.  Her family has no history of anesthesia reaction or bleeding or clotting disorder.  Lab obtained today including CMP, CBC and an EKG.  EKG per my read was completely normal with normal sinus rhythm normal axis no ST or T changes or ventricular hypertrophy, there is first degree block but patient has been told this before, I don't have a previous EKG to compare to and this should not hold up her surgery.  CBC is completely normal.  CMP is also completely normal.  She is a low risk patient undergoing a moderate risk procedure. Her [place] Cardiac risk score is 0.03% putting her in the low risk patient. No clinical clarification of testing is indicated.  No cardiology consultation is necessary. Patient is approved for surgery with anesthesia.  She was advised to discontinue aspirin and all NSAIDs-containing products at least 5 days prior to surgery and patient verbalized understanding and agreement.  Return to clinic as needed.    Subjective:     Scheduled Procedure: right total knee arthroplasty   Surgery Date:  6/5/2017  Surgery Location:  [facility]  Surgeon:  Dr. Ron    Current Outpatient Prescriptions   Medication Sig Dispense Refill     ASCORBATE CALCIUM  (VITAMIN C ORAL) Take 1 tablet by mouth daily.       CALCIUM CARBONATE/VITAMIN D3 (CALCIUM+D ORAL) Take 1 tablet by mouth daily.       ibuprofen (ADVIL,MOTRIN) 200 MG tablet Take 400 mg by mouth every 6 (six) hours as needed for pain.       POTASSIUM ORAL Take 1 tablet by mouth daily.       No current facility-administered medications for this visit.        No Known Allergies    Immunization History   Administered Date(s) Administered     Influenza, seasonal,quad inj 6-35 mos 09/29/2009, 09/14/2010, 11/07/2011     Pneumo Polysac 23-V 09/14/2010     Tdap 09/15/2007     ZOSTER 10/24/2008       Patient Active Problem List   Diagnosis     Spondylosis     Osteopenia     Hammer Toe     Serum Total Cholesterol Was Elevated     Soft Tissue Pain In Lower Extremities     Contusion With Intact Skin Surface     Menopause Has Occurred     Generalized Osteoarthritis     Spondylolisthesis     Groin (Inguinal) Pain     Synovial Cyst Of Popliteal Space     Osteoarthritis Of The Knee     Post-traumatic osteoarthritis of both knees     Meniscal injury, left, subsequent encounter 18months ago       Past Medical History:   Diagnosis Date     Basal cell carcinoma of hand 2013     Basal cell carcinoma of leg 2012       Social History     Social History     Marital status:      Spouse name: N/A     Number of children: N/A     Years of education: N/A     Occupational History     Not on file.     Social History Main Topics     Smoking status: Never Smoker     Smokeless tobacco: Never Used     Alcohol use 2.0 oz/week     4 drink(s) per week     Drug use: Not on file     Sexual activity: Not on file     Other Topics Concern     Not on file     Social History Narrative       Past Surgical History:   Procedure Laterality Date     HYSTERECTOMY  1993     OOPHORECTOMY Bilateral 1993     NE APPENDECTOMY      Description: Appendectomy;  Recorded: 08/24/2009;  Comments: 1967     NE ARTHRODESIS ANT INTERBODY MIN DISCECTOMY,LUMBAR       Description: Lumbar Vertebral Fusion;  Recorded: 08/24/2009;  Comments: L5-S1 with laminectomy x 2-1976 and 1977     FL CORRECT BUNION,OROZCO/NUBIA/JUSTUS      Description: Bunion Correction By Cheilectomy;  Recorded: 08/24/2009;  Comments: Padmini-1985     FL EXCISE HAND/FOOT NEUROMA      Description: Excision Of Neuroma Of Foot;  Recorded: 08/24/2009;  Comments: Padmini-1987     FL REMOVAL OF TONSILS,<9 Y/O      Description: Tonsillectomy;  Recorded: 08/24/2009;  Comments: Age 3       History of Present Illness  Chief Complaint   Patient presents with     Pre-op Exam     Right total knee arthroplasty on 6/5/2017 at [facility]      Ivis Cook is a 71 y.o. year old with history of osteoarthritis of the knee, requiring total arthroplasty of the right knee. She has been suffering from bilateral knee pain for about 3 years. She has been evaluated by a surgeon and has discussed risk, benefits, and alternative treatment. She has tried conservative therapy including pain relieving medications, Synvisc injection and cortisone injection without much relief or improvement. She has recently had her left knee replacement in 1/2017 in [place] and she recovered well. She's very pleased with the result. She denies recent injury/trauma.     Recent Health  Fever/chills: no  Chest Pain: no. She bikes about 3-4 miles 3-4 times a week and walks every day. She's able to do her house work.   Dyspnea: no  Nausea/vomiting: no  Diarrhea: no  Abdominal Pain: no  Easy Bruising: yes. Both her parents bruise easily. She denies easy bleeding and hasn't had any problem with bleeding after multiple surgeries. She stopped aspirin about 3 weeks ago.   Lower Extremity Swelling: no  Poor Exercise Tolerance: no    Most recent Health Maintenance Visit:  3 year(s) ago    Pertinent History  Prior Anesthesia: yes  Previous Anesthesia Reaction:  no  Diabetes: no  Cardiovascular Disease: no  Pulmonary Disease: no  Renal Disease: no  GI Disease:  "no  Sleep Apnea: no  Thromboembolic Problems/Bleeding problems: no  Impaired Immunity: no  Steroid use in the last 6 months: no  Frequent Aspirin use: yes, she stopped her aspirin about 3 weeks ago    No family history of anesthesia reaction, sudden death, clotting disorder and bleeding disorder. Her parents had easy bruising.     Social history of patient does not wear denture or partial plates, there is no transfusion refusal and NSAID use and there is no concern for recovery after surgery.     After surgery, the patient plans to recover at home with family and at a rehabilitation center.    Review of Systems  A 12 point comprehensive review of systems was negative except as noted.        Objective:         Vitals:    05/12/17 0746   BP: 128/82   Pulse: 78   Resp: 14   Temp: 98.3  F (36.8  C)   TempSrc: Oral   Weight: 143 lb (64.9 kg)   Height: 5' 2\" (1.575 m)       Physical Exam:  General Appearance: Alert, cooperative, no distress, appears stated age  Head: Normocephalic, without obvious abnormality, atraumatic  Eyes: PERRL, conjunctiva/corneas clear, EOM's intact  Ears: Normal TM's and external ear canals, both ears  Nose: Nares normal, septum midline,mucosa normal, no drainage  Throat: Lips, mucosa, and tongue normal; teeth and gums normal  Neck: Supple, symmetrical, trachea midline, no adenopathy;  thyroid: not enlarged, symmetric, no tenderness/mass/nodules; no carotid bruit or JVD  Back: Symmetric, no curvature, ROM normal, no CVA tenderness  Lungs: Clear to auscultation bilaterally, respirations unlabored  Breasts: Not examined   Heart: Regular rate and rhythm, S1 and S2 normal, no murmur, rub, or gallop,   Abdomen: Soft, non-tender, bowel sounds active all four quadrants,  no masses, no organomegaly  Pelvic:Not examined  Extremities: Extremities normal, atraumatic, no cyanosis or edema. Right knee with tenderness to palpation of the joint line medially and laterally  Skin: Skin color, texture, turgor " normal, no rashes or lesions  Lymph nodes: Cervical, supraclavicular, and axillary nodes normal  Neurologic: Normal      Darling Montana MD

## 2021-06-10 NOTE — TELEPHONE ENCOUNTER
Medication Question or Clarification  Who is calling: Ivis  What medication are you calling about (include dose and sig)?: Lisinopril 5 mg, one daily  Who prescribed the medication?  Addie Martinez MD   What is your question/concern?: Patient started taking this medication one month and has had a persistent cough since.  Medication has helped bring her blood pressure into normal range.  Patient would like to go off the lisinopril.  Please call her to advise.   Requested Pharmacy: [facility]  Okay to leave a detailed message?: Yes

## 2021-06-10 NOTE — PROGRESS NOTES
Please call patient    Here's your fasting lab work. Kidney/liver function normal. Blood counts normal, no anemia detected. Your cholesterol is high and your risk for heart attack and stroke is high in the next 10 years (13.2%) so I recommend a cholesterol lowering medication to help lower this risk. Certainly there are side effects most prominently muscle ache. If you want to start this medication (simvastatin 40 mg daily), please let me know. Otherwise we can recheck in a year and in the mean time you can adjust your diet to consume more healthy fats and fruits/vegetables and healthy proteins to help with this high cholesterol level. Let me know if you want to start the medication. Best wishes with your surgery.     Dr. Montana.

## 2021-06-10 NOTE — TELEPHONE ENCOUNTER
If cough resolved off lisinopril she can remain off this med.  I would then like to start her on a different blood pressure medication called Cozaar or losartan.  We will start the low-dose of 25 mg.  This should not cause a cough.  Please have a blood pressure check in 2 weeks after being on this medication.  Thank you.

## 2021-06-10 NOTE — TELEPHONE ENCOUNTER
Relayed message below from Dr. Martinez to patient. Pt voiced understanding, scheduled for nurse BP check 9/4/20.

## 2021-06-11 NOTE — ANESTHESIA PROCEDURE NOTES
Peripheral Block    Patient location during procedure: pre-op  Start time: 6/5/2017 6:48 AM  End time: 6/5/2017 6:53 AM  post-op analgesia per surgeon order as noted in medical record  Staffing:  Performing  Anesthesiologist: BALDOMERO GARCIA  Preanesthetic Checklist  Completed: patient identified, site marked, risks, benefits, and alternatives discussed, timeout performed, consent obtained, airway assessed, oxygen available, suction available, emergency drugs available and hand hygiene performed  Peripheral Block  Block type: sciatic, popliteal (Tibial)  Prep: ChloraPrep  Patient position: supine  Patient monitoring: cardiac monitor, continuous pulse oximetry, heart rate and blood pressure  Laterality: right  Injection technique: ultrasound guided and nerve stimulator  Nerve Stimulator mAmp: 0.6  Ultrasound used to visualize needle placement in proximity to nerve being blocked: yes   Permanent ultrasound image captured for medical record    Needle  Needle type: Stimuplex   Needle gauge: 21 G  Needle length: 4 in  no peripheral nerve catheter placed  Assessment  Injection assessment: negative aspiration for heme, no difficulty with injection, no paresthesia on injection and incremental injection

## 2021-06-11 NOTE — PROGRESS NOTES
Follow Up Blood Pressure Check    Ivis Cook is a 74 y.o. female recommended to follow up for blood pressure check by Addie Martinez MD. Anihypertensive medications and adherence were verified: Yes.     Reason for visit: Follow up from [facility]    Medication change at last visit: No    Today's Vitals:   Vitals:    09/04/20 0909   BP: 128/79   Pulse: 69       Home blood pressure readings brought in today:   NA    Lowest blood pressure today is less than 140/90 and they deny signs or symptoms of new onset: .  Please inform patient of his/her blood pressure today.  If they are asymptomatic, the patient is to continue current medications.  This message will be routed to their provider, and they will be notified if a change in medication is recommended.    ( may be deleted if not applicable) If lowest blood pressure is greater than 200/110, regardless if symptoms are present, patient needs to be evaluated by a provider today.    Janneth Kovacs    Current Outpatient Medications   Medication Sig Dispense Refill     alendronate (FOSAMAX) 70 MG tablet Take 1 tablet (70 mg total) by mouth every 7 days. Take in the morning on an empty stomach with a full glass of water 30 minutes before food 12 tablet 3     cholecalciferol, vitamin D3, 400 unit Tab Take 400 Units by mouth daily.       diphenhydrAMINE-acetaminophen (TYLENOL PM EXTRA STRENGTH)  mg Tab Take 1 tablet by mouth at bedtime as needed.       esomeprazole (NEXIUM) 20 MG capsule        losartan (COZAAR) 25 MG tablet Take 1 tablet (25 mg total) by mouth daily. 90 tablet 1     magnesium 30 mg tablet Take 30 mg by mouth 2 (two) times a day.       omega 3-dha-epa-fish oil (FISH OIL) 1,000 mg (120 mg-180 mg) cap Take by mouth.       POTASSIUM ORAL Take 1 tablet by mouth daily. otc med for leg cramps       rosuvastatin (CRESTOR) 5 MG tablet Take 1 tablet (5 mg total) by mouth at bedtime. 90 tablet 3     No current facility-administered medications for this visit.

## 2021-06-11 NOTE — ANESTHESIA CARE TRANSFER NOTE
Last vitals:   Vitals:    06/05/17 0945   BP: 130/71   Pulse: 97   Resp: 12   Temp: 36.4  C (97.6  F)   SpO2: 100%     Patient's level of consciousness is awake  Spontaneous respirations: yes  Maintains airway independently: yes  Dentition unchanged: yes  Oropharynx: oropharynx clear of all foreign objects    QCDR Measures:  ASA# 20 - Surgical Safety Checklist: ASA20A - Safety Checks Done  PQRS# 430 - Adult PONV Prevention: 4558F - Pt received => 2 anti-emetic agents (different classes) preop & intraop  ASA# 8 - Peds PONV Prevention: NA - Not pediatric patient, not GA or 2 or more risk factors NOT present  PQRS# 424 - Meka-op Temp Management: 4559F - At least one body temp DOCUMENTED => 35.5C or 95.9F within required timeframe  PQRS# 426 - PACU Transfer Protocol: - Transfer of care checklist used  ASA# 14 - Acute Post-op Pain: ASA14B - Patient did NOT experience pain >= 7 out of 10

## 2021-06-11 NOTE — ANESTHESIA POSTPROCEDURE EVALUATION
Patient: Ivis Cook  RIGHT TOTAL KNEE ARTHROPLASTY  Anesthesia type: spinal    Patient location: PACU  Last vitals:   Vitals:    06/05/17 1430   BP: 122/66   Pulse: 83   Resp: 16   Temp:    SpO2: 96%     Post vital signs: stable  Level of consciousness: awake and responds to simple questions  Post-anesthesia pain: pain controlled  Post-anesthesia nausea and vomiting: no  Pulmonary: unassisted, return to baseline  Cardiovascular: stable and blood pressure at baseline  Hydration: adequate  Anesthetic events: no    QCDR Measures:  ASA# 11 - Meka-op Cardiac Arrest: ASA11B - Patient did NOT experience unanticipated cardiac arrest  ASA# 12 - Meka-op Mortality Rate: ASA12B - Patient did NOT die  ASA# 13 - PACU Re-Intubation Rate: ASA13B - Patient did NOT require a new airway mgmt  ASA# 10 - Composite Anes Safety: ASA10A - No serious adverse event  ASA# 38 - New Corneal Injury: ASA38A - No new exposure keratitis or corneal abrasion in PACU    Additional Notes:

## 2021-06-11 NOTE — PROGRESS NOTES
[facility] Patient Outreach:     Discharge Date::  6/9/17      Reason for TCU stay (discharge diagnosis)::  Right TKA, DJD      Are you feeling better, the same or worse since your discharge?:  Patient is feeling better          As part of your discharge plan, did they discuss home care with you?: Yes        Have your seen them yet, or are they scheduled to visit?: Yes                Do you have any follow up visits scheduled with your PCP or Specialist?:  Yes, with Specialist      Who are you seeing and when is it scheduled?:  Ortho on 6/20      I'm glad to hear you're doing well and we want you to continue to do well. Your PCP would like to see you for a follow-up visit. Can we help set that up for your today?: Yes            (RN) Patient transferred to [facility]? **If immediate concers (e.g. patient is feeling worse and/or not taking new medictations), send in basket message to PCP with quick summary of concern.: Yes

## 2021-06-11 NOTE — PROGRESS NOTES
[facility]      Facility:    [facility] [[id number]]  Code Status: UNKNOWN      Chief Complaint/Reason for Visit:  Chief Complaint   Patient presents with     H & P     DJD with elective right total knee arthroplasty, pain management, anxiety, hypercholesterolemia,.       HPI:   Ivis is a 71 y.o. female who was recently admitted to the hospital on 6/5/2017 for elective right total knee arthroplasty.  This was done secondary to degenerative joint disease which was refractory to conservative management.  Her preop physical was done and all risks benefits and alternative therapies was discussed with her by her surgeon and she elected to have the surgery.  She was then admitted to the hospital on the above date and underwent the procedure without any perioperative or postoperative complications.  She was then transferred here to the TCU in stable condition were pain was well-controlled with Tylenol as well as hydromorphone.  She is not taking much of the hydromorphone anymore and she claims her pain is well managed she has progressed as anticipated with physical and Occupational Therapy.  There was an event last evening which prompted her to want to go home and this indicated another patient did wander in her room.  This patient was somewhat delirious and does have a history of dementia and she decided that she does want to go home.  She is no longer traumatized at this time but it was traumatized for her and the staff has provided good care according to her.  She is doing okay at this time and claims that she is doing okay and she will be much better at home.  After my physical exam and interview with the patient I feel she would do okay with home with therapies and no other issues.    Pain well controlled and she is moving her bowels without difficulty.  The rest of the review of systems is negative.    Past Medical History:  Past Medical History:   Diagnosis Date     Basal  cell carcinoma of hand 2013     Basal cell carcinoma of leg 2012     Generalized osteoarthritis      Osteopenia      Synovial cyst popliteal space     Left            Surgical History:  Past Surgical History:   Procedure Laterality Date     HYSTERECTOMY  1993     left knee arthroplasty Left 01/2017     OOPHORECTOMY Bilateral 1993     HI APPENDECTOMY      Description: Appendectomy;  Recorded: 08/24/2009;  Comments: 1967     HI ARTHRODESIS ANT INTERBODY MIN DISCECTOMY,LUMBAR      Description: Lumbar Vertebral Fusion;  Recorded: 08/24/2009;  Comments: L5-S1 with laminectomy x 2-1976 and 1977     HI CORRJ HALLUX VALGUS W/SESMDC W/RESCJ PROX PHAL      Description: Bunion Correction By Cheilectomy;  Recorded: 08/24/2009;  Comments: Padmini-1985     HI EXCISE HAND/FOOT NEUROMA      Description: Excision Of Neuroma Of Foot;  Recorded: 08/24/2009;  Comments: Padmini-1987     HI REMOVAL OF TONSILS,<9 Y/O      Description: Tonsillectomy;  Recorded: 08/24/2009;  Comments: Age 3     HI TOTAL KNEE ARTHROPLASTY Right 6/5/2017    Procedure: RIGHT TOTAL KNEE ARTHROPLASTY;  Surgeon: Donaldo Ron DO;  Location: [facility];  Service: Orthopedics       Family History:   Family History   Problem Relation Age of Onset     Lung cancer Father        Social History:    Social History     Social History     Marital status:      Spouse name: N/A     Number of children: N/A     Years of education: N/A     Social History Main Topics     Smoking status: Never Smoker     Smokeless tobacco: Never Used     Alcohol use 2.0 oz/week     4 Standard drinks or equivalent per week      Comment: Occ     Drug use: No     Sexual activity: No     Other Topics Concern     None     Social History Narrative          Review of Systems   Constitutional: Negative for activity change, chills and fever.   HENT: Negative for hearing loss.    Eyes: Negative for visual disturbance.   Respiratory: Negative for apnea, chest tightness and shortness of  breath.    Cardiovascular: Negative for chest pain and palpitations.   Gastrointestinal: Negative for abdominal pain, constipation, nausea and vomiting.   Endocrine: Negative for polydipsia, polyphagia and polyuria.   Genitourinary: Negative for decreased urine volume and urgency.   Musculoskeletal: Negative for neck pain and neck stiffness.   Skin: Negative for rash.   Hematological: Does not bruise/bleed easily.   Psychiatric/Behavioral: Negative for agitation and behavioral problems.       Vitals:    06/09/17 1144   BP: 156/70   Pulse: 80   Resp: 20   Temp: 97.9  F (36.6  C)   SpO2: 96%       Physical Exam   Constitutional: She is oriented to person, place, and time. She appears well-developed and well-nourished. No distress.   HENT:   Head: Normocephalic and atraumatic.   Nose: Nose normal.   Mouth/Throat: Oropharynx is clear and moist. No oropharyngeal exudate.   Eyes: Conjunctivae and EOM are normal. Pupils are equal, round, and reactive to light. Right eye exhibits no discharge. Left eye exhibits no discharge. No scleral icterus.   Neck: Neck supple. No tracheal deviation present. No thyromegaly present.   Cardiovascular: Normal rate, regular rhythm and normal heart sounds.  Exam reveals no gallop and no friction rub.    No murmur heard.  Pulmonary/Chest: Effort normal. No respiratory distress. She has no wheezes. She has no rales.   Abdominal: Soft. Bowel sounds are normal. She exhibits no distension and no mass. There is no tenderness. There is no rebound and no guarding.   Musculoskeletal: Normal range of motion. She exhibits no edema or tenderness.   Lymphadenopathy:     She has no cervical adenopathy.   Neurological: She is alert and oriented to person, place, and time. She displays normal reflexes. No cranial nerve deficit. She exhibits normal muscle tone. Coordination normal.   Skin: Skin is warm and dry. No rash noted. She is not diaphoretic. No erythema. No pallor.   Psychiatric: She has a normal  mood and affect. Her behavior is normal.       Medication List:  Current Outpatient Prescriptions   Medication Sig     acetaminophen (TYLENOL) 500 MG tablet Take 2 tablets (1,000 mg total) by mouth 3 (three) times a day as needed for pain.     aspirin 325 MG EC tablet Take 1 tablet (325 mg total) by mouth 2 (two) times a day with meals.     cholecalciferol, vitamin D3, 400 unit Tab Take 400 Units by mouth daily.     HYDROmorphone (DILAUDID) 2 MG tablet Take 1 tablet (2 mg total) by mouth every 4 (four) hours as needed for pain.     LORazepam (ATIVAN) 0.5 MG tablet Take 0.5 tablets (0.25 mg total) by mouth every 8 (eight) hours as needed for anxiety (or sleep).     polyvinyl alcohol (ARTIFICIAL TEARS, POLYVIN ALC,) 1.4 % ophthalmic solution Administer 1 drop to both eyes as needed for dry eyes.     POTASSIUM ORAL Take 1 tablet by mouth daily. otc med for leg cramps     senna-docusate (SENNOSIDES-DOCUSATE SODIUM) 8.6-50 mg tablet Take 1 tablet by mouth 2 (two) times a day as needed for constipation.     simvastatin (ZOCOR) 40 MG tablet Take 1 tablet (40 mg total) by mouth every evening.       Labs: Hemoglobin was 11.3 and potassium was 4.2.  And these were preoperative labs.      Assessment:    ICD-10-CM    1. Degenerative joint disease M19.90    2. History of total knee arthroplasty, right Z96.651    3. Pain management R52        Plan: Plan at this time is to discharge home with current meds and services and I will do a prescription for hydromorphone.  I will also include physical and occupational therapy and home health aide for home baby and meds set up.  She will receive therapies daily at home because she still is homebound with her new surgery and she will follow-up with her primary care physician in 1 week.  No other changes to care plan at this time and over 38 minutes was spent on this admission with greater than 50% of the time dedicated to coordination of care        Electronically signed by: Oli PRO  DO Ye

## 2021-06-11 NOTE — ANESTHESIA PROCEDURE NOTES
Spinal Block    Patient location during procedure: OR  Start time: 6/5/2017 7:32 AM  End time: 6/5/2017 7:40 AM  Reason for block: primary anesthetic    Staffing:  Performing  Anesthesiologist: BALDOMERO GARCIA    Preanesthetic Checklist  Completed: patient identified, risks, benefits, and alternatives discussed, timeout performed, consent obtained, airway assessed, oxygen available, suction available, emergency drugs available and hand hygiene performed  Spinal Block  Patient position: sitting  Prep: ChloraPrep and site prepped and draped  Patient monitoring: heart rate, cardiac monitor, continuous pulse ox and blood pressure  Approach: midline  Injection technique: single-shot  Needle type: pencil-tip   Needle gauge: 24 G    Assessment  Events: failed spinal    Additional Notes:  Unable to access IT space in this patient with prior back surgery. Converted to GA.

## 2021-06-11 NOTE — ANESTHESIA PREPROCEDURE EVALUATION
Anesthesia Evaluation      Patient summary reviewed   No history of anesthetic complications     Airway   Mallampati: II  Neck ROM: full   Pulmonary - normal exam    breath sounds clear to auscultation  (-) sleep apnea, not a smoker                         Cardiovascular   Exercise tolerance: > or = 4 METS  (+) , hypercholesterolemia,     (-) murmur  ECG reviewed  Rhythm: regular  Rate: normal,    no murmur      Neuro/Psych - negative ROS     Endo/Other    (+) arthritis,   (-) no diabetes, hypothyroidism, hyperthyroidism, not pregnant     GI/Hepatic/Renal    (-) GERD          Dental - normal exam                        Anesthesia Plan  Planned anesthetic: spinal and peripheral nerve block    ASA 2   Induction: intravenous   Anesthetic plan and risks discussed with: patient  Anesthesia plan special considerations: antiemetics,   Post-op plan: routine recovery

## 2021-06-11 NOTE — ANESTHESIA PROCEDURE NOTES
Peripheral Block    Patient location during procedure: pre-op  Start time: 6/5/2017 6:54 AM  End time: 6/5/2017 6:58 AM  post-op analgesia per surgeon order as noted in medical record  Staffing:  Performing  Anesthesiologist: BALDOMERO GARCIA  Preanesthetic Checklist  Completed: patient identified, site marked, risks, benefits, and alternatives discussed, timeout performed, consent obtained, airway assessed, oxygen available, suction available, emergency drugs available and hand hygiene performed  Peripheral Block  Block type: saphenous, adductor canal block  Prep: ChloraPrep  Patient position: supine  Patient monitoring: cardiac monitor, continuous pulse oximetry, heart rate and blood pressure  Laterality: right  Injection technique: ultrasound guided    Ultrasound used to visualize needle placement in proximity to nerve being blocked: yes   Permanent ultrasound image captured for medical record    Needle  Needle type: Stimuplex   Needle gauge: 21 G  Needle length: 4 in  no peripheral nerve catheter placed  Assessment  Injection assessment: no difficulty with injection, negative aspiration for heme, incremental injection and no paresthesia on injection

## 2021-06-11 NOTE — PROGRESS NOTES
Dear Dr. Licea ,  Your patient, Ivis Cook was seen today for management of osteopenia with high fracture risk.  The last bone density scan was done on 6/16/20:  1. The spine bone density L1-L4 with T-score cannot be evaluated due to the presence of significant degenerative changes which have artifactually elevated the bone mineral density.  2. Femoral bone densities show left femoral neck T- score -1.7 and right total hip T-score -1.1.  3. Trabecular bone score indicates moderate trabecular bone architecture.  4.  The left one third radius T score -1.7.     74 y.o. female with LOW BONE DENSITY (OSTEOPENIA) and HIGH fracture risk, adjusted for the TBS, with major osteoporotic fracture risk 12.7 % and hip fracture risk 3.2 %.  According to the World Health Organization, a hip fracture risk greater than 3.0% can be an indication for evaluation and treatment of low bone mass.     Since the previous bone density dated  May 8, 2014,  there has been a -11.4 % change in the left total hip and a -5.8 % change in the right total hip.    Patient was treated in the past with [facility] for 3 years, more than 15 years ago.  Social history:  reports that she has never smoked. She has never used smokeless tobacco. She reports current alcohol use of about 3.3 standard drinks of alcohol per week. She reports that she does not use drugs.    Past medical history:   Patient Active Problem List   Diagnosis     Spondylosis     Osteopenia     Hammer Toe     Serum Total Cholesterol Was Elevated     Soft Tissue Pain In Lower Extremities     Contusion With Intact Skin Surface     Menopause Has Occurred     Spondylolisthesis     Groin (Inguinal) Pain     Synovial Cyst Of Popliteal Space     Osteoarthritis Of The Knee     Post-traumatic osteoarthritis of both knees     Meniscal injury, left, subsequent encounter 18months ago     Osteoarthritis of right knee     Primary osteoarthritis involving multiple joints     Chronic right shoulder  pain     Rotator cuff tendinitis, right     Acute pain of right shoulder     Benign essential hypertension     History of basal cell carcinoma     History of fractures: no     FH: mother had osteoporosis and spine fracture  Family History   Problem Relation Age of Onset     Lung cancer Father      Stroke Mother        Diet and supplements: Ca 500 mg two times a day, vit D 1000 IU    Risk medications: none    Gynecologic history: THBO at age 31, was on HRT for 2 years only    Laboratory testing:   Component      Latest Ref Rng & Units 6/16/2020   Sodium      136 - 145 mmol/L 140   Potassium      3.5 - 5.0 mmol/L 4.6   Chloride      98 - 107 mmol/L 105   CO2      22 - 31 mmol/L 23   Anion Gap, Calculation      5 - 18 mmol/L 12   Glucose      70 - 125 mg/dL 92   BUN      8 - 28 mg/dL 13   Creatinine      0.60 - 1.10 mg/dL 0.74   GFR MDRD Af Amer      >60 mL/min/1.73m2 >60   GFR MDRD Non Af Amer      >60 mL/min/1.73m2 >60   Bilirubin, Total      0.0 - 1.0 mg/dL 0.6   Calcium      8.5 - 10.5 mg/dL 10.1   Protein, Total      6.0 - 8.0 g/dL 7.0   ALBUMIN      3.5 - 5.0 g/dL 4.2   Alkaline Phosphatase      45 - 120 U/L 57   AST      0 - 40 U/L 22   ALT      0 - 45 U/L 20   WBC      4.0 - 11.0 thou/uL 6.6   RBC      3.80 - 5.40 mill/uL 4.34   Hemoglobin      12.0 - 16.0 g/dL 14.0   Hematocrit      35.0 - 47.0 % 41.6   MCV      80 - 100 fL 96   MCH      27.0 - 34.0 pg 32.2   MCHC      32.0 - 36.0 g/dL 33.7   RDW      11.0 - 14.5 % 12.9   Platelets      140 - 440 thou/uL 292   MPV      7.0 - 10.0 fL 7.4   Vitamin D, Total (25-Hydroxy)      30.0 - 80.0 ng/mL 23.6 (L)       ROS:     Constitutional: Negative.    HENT: Negative.    Eyes: Negative.    Respiratory: Negative.    Cardiovascular: Negative.    Gastrointestinal: Negative.    Endocrine: Negative.    Genitourinary: Negative.    Musculoskeletal: Negative.    Skin: Negative.    Allergic/Immunologic: Negative.    Neurological: Negative.    Hematological: Negative.     Psychiatric/Behavioral: Negative.      PE:  /64   Pulse 85   Wt 145 lb 9.6 oz (66 kg)   SpO2 97%   BMI 25.79 kg/m    Constitutional:  oriented to person, place, and time, appears well-nourished. No distress.           Impression:   1. Osteopenia with high risk of fracture  alendronate (FOSAMAX) 70 MG tablet    DXA Bone Density Scan   2. Osteopenia of other site   DXA Bone Density Scan       Plan:  1. The patient will take 1200 - 1500 mg of calcium daily from the diet and supplements.  2. The patient will take 2000 IU of vit D daily.  3. Treatment options discussed with the patient and we will start Fosamax weekly.   4. I am asking for follow up in 1 year with DXA scan .    Patient Instructions   Start Fosamax weekly.  DXA in 1 year. [phone].    25 minutes spent with the patient and more than 50 % of the time in counseling about osteoporosis treatment options.    Thank you for the opportunity to participate in the care of your patient. If you have any additional questions, do not hesitate to contact me at [facility].    This note has been dictated using voice recognition software. Any grammatical or context distortions are unintentional and inherent to the software      With best personal regards,   Grant Burden MD, CCD  9/3/2020

## 2021-06-12 NOTE — TELEPHONE ENCOUNTER
I am very sorry but I am not licensed to practice medicine in [place].  If she needs a Covid test in [place] she will have to find a testing site there.

## 2021-06-12 NOTE — TELEPHONE ENCOUNTER
Ok, ordered.  She will have to when it needs to be done for her travel, like 3-5 days ahead of time?

## 2021-06-12 NOTE — PROGRESS NOTES
Assessment & Plan:  1. Hyperlipidemia  Lipid Cascade FASTING   2. Eustachian tube dysfunction       Recheck lipids since off medication, follow for results. Patient hesitant to start a different statin due to side effects. For ear fullness, suggested Flonase for a 2 week period, follow up if worsening.     There are no Patient Instructions on file for this visit.    Orders Placed This Encounter   Procedures     Influenza High Dose, Seasonal 65+ yrs     Lipid Lea FASTING     Order Specific Question:   Fasting is required?     Answer:   Yes     Medications Discontinued During This Encounter   Medication Reason     simvastatin (ZOCOR) 40 MG tablet Therapy completed           Chief Complaint:   Chief Complaint   Patient presents with     Hyperlipidemia     follow up. Pt was put on Simvastatin 40 mg and pt stopped taking it do to side effects of light headedness      Ear Fullness     c/o left ear feeling full        History of Present Illness:  Ivis is a 71 y.o. female presenting to the clinic today for recheck of cholesterol.  She was recently started on simvastatin but stopped taking it due to side effects.  She is getting lightheaded and dizzy which would occur occasionally to while driving.  She stopped this medication and let the doctor know.  They recommended she come in for cholesterol recheck.  She is fasting today for this.  Also complaining of her left ear feeling full for the past few days.  She has a history of ear problems and infections and would like it checked.  She has not been taking anything to try to relieve ear pressure.  She has had some congestion along with her ear pressure for the past couple of days.  No fevers or chills.  No exposures to illness.    Review of Systems:  All other systems are negative except as noted above.     PFSH:  Reviewed and updated.     Tobacco Use:  History   Smoking Status     Never Smoker   Smokeless Tobacco     Never Used       Vitals:  Vitals:    09/01/17  0853   BP: 114/74   Patient Site: Left Arm   Patient Position: Sitting   Cuff Size: Adult Regular   Pulse: 72   Resp: 16   Temp: 97.9  F (36.6  C)   TempSrc: Oral   Weight: 140 lb 8 oz (63.7 kg)     Wt Readings from Last 3 Encounters:   09/01/17 140 lb 8 oz (63.7 kg)   06/20/17 140 lb 3 oz (63.6 kg)   06/05/17 138 lb 8 oz (62.8 kg)       Physical Exam:  Constitutional:  Reveals an alert, cooperative, 71 year old female in no acute distress.  Vitals:  Per nursing notes.  Eyes: PERRLA, funduscopic exam within normal limits.  HENT: left TM with effusion, right TM normal,Oropharynx with erythema, clear posterior nasal drainage, no thrush. Neck supple,  thyroid not palpable. Nasal mucosa pink with increased rhinorrhea. Sinuses nontender to palpation.   Cardiovascular:  Regular rate and rhythm without murmurs, rubs, or gallops. Carotids without bruits. Legs without edema.   Respiratory: Clear.  Respiratory effort normal.  Lymph: Anterior cervical lymphadenopathy not present, Posterior cervical lymphadenopathy not present, lymph nodes nontender to palpation.   Psychiatric:  Mood appropriate, alert and oriented x3.    Data Reviewed:  Additional History from Old Records or Another Person Summarized (2 total): None.     Decision to Obtain Extra information (1 total): None.     Radiology Tests Summarized and Ordered (XRAY/CT/MRI/DXA) (1 total): None.    Labs Reviewed and Ordered (1 total):none    Medicine Tests Summarized and Ordered (EKG/ECHO/COLONOSCOPY/EGD) (1 total): None.    Independent Review of EKG or X-Ray (2 each): None.    The visit lasted a total of 20 minutes face to face with the patient. Over 50% of the time was spent counseling and educating the patient about plan of care.    Medications:  Current Outpatient Prescriptions   Medication Sig Dispense Refill     aspirin 325 MG EC tablet Take 1 tablet (325 mg total) by mouth 2 (two) times a day with meals. 60 tablet 0     cholecalciferol, vitamin D3, 400 unit Tab  Take 400 Units by mouth daily.       diphenhydrAMINE-acetaminophen (TYLENOL PM EXTRA STRENGTH)  mg Tab Take 1 tablet by mouth at bedtime as needed.       LORazepam (ATIVAN) 0.5 MG tablet Take 0.5 tablets (0.25 mg total) by mouth every 8 (eight) hours as needed for anxiety (or sleep). 10 tablet 0     polyvinyl alcohol (ARTIFICIAL TEARS, POLYVIN ALC,) 1.4 % ophthalmic solution Administer 1 drop to both eyes as needed for dry eyes.       POTASSIUM ORAL Take 1 tablet by mouth daily. otc med for leg cramps       senna-docusate (SENNOSIDES-DOCUSATE SODIUM) 8.6-50 mg tablet Take 1 tablet by mouth 2 (two) times a day as needed for constipation. 100 tablet 0     No current facility-administered medications for this visit.        Total Data Points: 1    TOOTIE Theodore, CNP    This note has been dictated using voice recognition software. Any grammatical or context distortions are unintentional and inherent to the software

## 2021-06-12 NOTE — TELEPHONE ENCOUNTER
Please call patient at [phone] (home)  Ok to leave detailed message.    Patient stating she is needing a hand written order for COVID 19 testing to carry with her on travel to [place].   Stating she was advised to have the COVID 19 testing done when she arrives and will need an order with her.     Patient is requesting to  order on 11/13/20.    Anna Morgan RN  [facility]    Reason for Disposition    [1] Caller requesting NON-URGENT health information AND [2] PCP's office is the best resource    Additional Information    Negative: [1] Caller is not with the adult (patient) AND [2] reporting urgent symptoms    Negative: Lab result questions    Negative: Medication questions    Negative: Caller can't be reached by phone    Negative: Caller has already spoken to PCP or another triager    Negative: RN needs further essential information from caller in order to complete triage    Negative: Requesting regular office appointment    Protocols used: INFORMATION ONLY CALL-A-

## 2021-06-12 NOTE — TELEPHONE ENCOUNTER
Who is calling:  Ivis    Reason for Call:    Pt is asking for a covid test to be ordered because she is flying to [place] in 2 weeks.    Date of last appointment with primary care:   Okay to leave a detailed message: Yes

## 2021-06-16 ENCOUNTER — RECORDS - HEALTHEAST (OUTPATIENT)
Dept: INTERNAL MEDICINE | Facility: CLINIC | Age: 76
End: 2021-06-16

## 2021-06-16 ENCOUNTER — RECORDS - HEALTHEAST (OUTPATIENT)
Dept: FAMILY MEDICINE | Facility: CLINIC | Age: 76
End: 2021-06-16

## 2021-06-16 PROBLEM — M75.81 ROTATOR CUFF TENDINITIS, RIGHT: Status: ACTIVE | Noted: 2018-10-23

## 2021-06-16 PROBLEM — Z85.828 HISTORY OF BASAL CELL CARCINOMA: Status: ACTIVE | Noted: 2020-07-12

## 2021-06-16 PROBLEM — M17.11 OSTEOARTHRITIS OF RIGHT KNEE: Status: ACTIVE | Noted: 2017-06-05

## 2021-06-16 PROBLEM — M25.511 CHRONIC RIGHT SHOULDER PAIN: Status: ACTIVE | Noted: 2018-09-27

## 2021-06-16 PROBLEM — M15.0 PRIMARY OSTEOARTHRITIS INVOLVING MULTIPLE JOINTS: Status: ACTIVE | Noted: 2018-09-27

## 2021-06-16 PROBLEM — M25.511 ACUTE PAIN OF RIGHT SHOULDER: Status: ACTIVE | Noted: 2019-05-20

## 2021-06-16 PROBLEM — G89.29 CHRONIC RIGHT SHOULDER PAIN: Status: ACTIVE | Noted: 2018-09-27

## 2021-06-16 PROBLEM — I10 BENIGN ESSENTIAL HYPERTENSION: Status: ACTIVE | Noted: 2020-06-03

## 2021-06-18 NOTE — PATIENT INSTRUCTIONS - HE
Patient Instructions by Addie Martinez MD at 7/10/2020 11:20 AM     Author: Addie Martinez MD Service: -- Author Type: Physician    Filed: 7/12/2020  9:08 PM Encounter Date: 7/10/2020 Status: Signed    : Addie Martinez MD (Physician)    Related Notes: Original Note by Addie Martinez MD (Physician) filed at 7/10/2020 12:29 PM       Check with insurance on converage of Tdap and Shingryx shots    Need Prevnar 13 shot    These 3 shots were ordered for our clinic but should check with insurance on coverage first.    Appointment with osteoporosis clinic in sept. For low bone mass and high risk of fracture on DEXA in June 2020.    Follow up in 6 months for med check    Star Crestor 5 mg at bedtime.  This is a cholesterol medication to help reduce cholesterol as well as reduce your risk of heart attack and stroke.  Per our risk calculator you do have a 25% risk of heart attack and stroke.    You will follow-up with [facility] to discuss whether or not the Xarelto is to be long lifelong or not for her.  We will get the lab results that were done there for our review.    With history of skin cancer should see dermatologist yearly.  This was not discussed with the patient but we will point this out in the after visit summary.      Patient Education    Home Fire Safety  Each year, thousands of people, including children, are injured and killed in home fires. Children are often curious about fire, and may not understand the dangers. This makes home fire safety practices especially important. Three important things you can do to keep your home safe from fire are:    Install smoke alarms in your home and make sure they work properly.    Teach children not to play with matches, lighters, and other materials that can be used to start fires. And keep these materials out of childrens reach.    Teach children what to do in case of fire. Create a fire safety action plan and practice  it.  Read on for more details about keeping your family and home safe from fire.        Being Prepared for a Fire  A home fire can happen at any time. The following can help you be prepared:    Install smoke alarms on every level of your home, including the basement and outside all sleeping areas. This simple step cuts your familys risk of dying in a fire nearly in half.    Test smoke alarms monthly, and change the batteries once a year or when the alarm chirps.    Dont disable smoke alarms, even for a short time.    Ask your local fire department for tips on where to place smoke alarms in your home.    Replace all smoke alarms every 10 years.    Consider using voice smoke alarms. These alarms allow you to substitute your own voice for the alarm sound. They are helpful because many children dont wake up to the sound of a regular smoke alarm.    Install carbon monoxide detectors near sleeping areas.    Be aware that carbon monoxide is a byproduct of smoke that can be deadly. Its a gas that you cant see, smell, or taste.    Consider buying a combination smoke alarm / carbon monoxide detector.    Keep fire extinguishers in the home.    Keep them in accessible locations, especially in the kitchen.    Check usage dates to make sure they are not .    Use fire extinguishers only when the fire is in a contained area and is not spreading. (Otherwise, you should focus on getting out of the home.)    Train adults to use fire extinguishers. (Children should focus on getting out of the home during a fire.)    If you live in an apartment, talk to your landlord about where smoke alarms are and how often they are tested. Also ask about fire extinguisher locations and emergency exit routes.  Indoor Fire Safety  Many things in your home are potential fire hazards. Follow these steps to help keep your home safe.    Be careful in the kitchen.    Never leave food thats cooking unattended.    If a fire breaks out in a cooking pan,  put a lid on it to smother it. And never throw water on a grease fire. It will make the fire worse.    Conduct a home safety inspection. Look for anything, such as frayed wires and cords, that can cause a fire. Fix or remove any fire safety hazards you find.    Keep all matches and lighters in a secured drawer or cabinet out of the reach of children. Use childproof lighters.    Check to make sure all appliances, including the stove, are turned off before leaving the home.    Know where the gas main shut-off is located.    Make sure space heaters are stable and have protective covers. Keep them at least 3 feet from anything that can burn, such as curtains. Dont use space heaters in areas where young kids spend time alone.    Keep flammable liquids such as kerosene and gasoline locked up and safely stored away from kids and heat.    Keep all smoking materials out of reach of children. And never smoke in bed. If possible, smoke outdoors only.  Outdoor Fire Safety  Fire can be a hazard outdoors as well as indoors. When outdoors, be sure to do the following:    Always supervise kids near a barbecue grill, campfire, or portable stove.    Dont use fire pits around children. Kids can fall into them, and pits can be hot even after the fire goes out.    Keep a garden hose or fire extinguisher handy when cooking outdoors in case of fire.  Teaching Your Child About Fire Safety  One of the best ways to keep your home safe from fire is education. Make sure everyone in your family knows fire safety rules, including children.    Teach your children the dangers of matches, lighters, and other dangerous items.    Teach them to never touch these and other objects that are hot, such as candles.    Have them tell you right away whenever they find matches or lighters. Explain that these items are tools for grown-ups, not toys. And never amuse children with matches or lighters.    Round up all matches and lighters and store them safely.  In case you missed some, ask your children to tell you where any are located throughout your home.    Never leave a child alone in a room with a lit candle. Dont allow teens to have candles in their rooms.    Show children what to do in case of fire.    Be sure your kids know what the fire alarm sounds like and what to do if it goes off.    Teach kids what to do if their clothes catch fire: Stop, Drop to the ground, and Roll until the fire is put out. They should also cover their face with their hands. Practice these steps with your children. Make sure they understand that running will make the fire burn faster.    Show children how to crawl below smoke during a fire.    Make sure kids know at least two escape routes from each room in the home. These escape routes can be windows.    Teach kids to test doors for nearby fire by feeling for heat with the back of their hand. If the door is warm or hot, they should try their second exit.    Explain to children that they cant hide from a fire. Hiding in a closet or under a bed wont make them safe. Instead, they should try to escape the home. And if they cant escape, they should let others know they are trapped. They can do this by shutting the door to the room, opening a window, and turning on the lights.    Talk to your local fire department.    Introduce your children to a . Let them know that firefighters will look different when in full protective gear. Tell them to never hide from firefighters, and to follow all directions from firefighters during a fire.    Find out if the fire department has a fire safety program for kids.      Create a Fire Safety Plan  Create a plan for your family to follow in case of a fire. Try making it a family project. Important steps for the plan include leaving the home right away and having a designated meeting place.    Make sure your child understands to get out and stay out. He or she should get out of the home immediately  and not go back in, even if family members or pets are still inside.    Decide on a safe meeting place away from the home for everyone to gather.    Teach children to call 911 or emergency services from a cell phone or neighbors phone. Make sure they know to do this only after they are safely out of the home.    Teach your children the fire safety plan. Practice it and make sure they understand it.    Have fire drills twice a year to keep your children prepared in case of fire.    Visit the National Fire Protection Association web site at www.nfpa.org for more information.      9984-1328 [employer]. [address] [place] [address]. All rights reserved. This information is not intended as a substitute for professional medical care. Always follow your healthcare professional's instructions.         Patient Education   Preventing Falls in the Home  As you get older, falls are more likely. Thats because your reaction time slows. Your muscles and joints may also get stiffer, making them less flexible. Illness, medications, and vision changes can also affect your balance. A fall could leave you unable to live on your own. To make your home safer, follow these tips:    Floors    Put nonskid pads under area rugs.    Remove throw rugs.    Replace worn floor coverings.    Tack carpets firmly to each step on carpeted stairs. Put nonskid strips on the edges of uncarpeted stairs.    Keep floors and stairs free of clutter and cords.    Arrange furniture so there are clear pathways.    Clean up any spills right away.    Bathrooms    Install grab bars in the tub or shower.    Apply nonskid strips or put a nonskid rubber mat in the tub or shower.    Sit on a bath chair to bathe.    Use bathmats with nonskid backing.    Lighting    Keep a flashlight in each room.    Put a nightlight along the pathway between the bedroom and the bathroom.    6286-5301 [employer]. [address] [place] [address]  [place] [address]. All rights reserved. This information is not intended as a substitute for professional medical care. Always follow your healthcare professional's instructions.           Advance Directive  Patients advance directive was discussed and I am comfortable with the patients wishes.  Patient Education   Personalized Prevention Plan  You are due for the preventive services outlined below.  Your care team is available to assist you in scheduling these services.  If you have already completed any of these items, please share that information with your care team to update in your medical record.

## 2021-06-20 NOTE — PROGRESS NOTES
ASSESSMENT AND PLAN:  Ivis Cook 72 y.o. female is seen here on 09/27/18 for evaluation of polyarthralgia.  This is very likely all associated with osteoarthritis and its accompaniments.  She has some features suggestive of tendinopathy of the right shoulder.  She is on indefinite anticoagulation not a candidate for nonsteroidals.  We discussed duloxetine.  I have given her literature to review.  Should she decide to proceed with that she let us know.  She is aware that the that the fundamental principal of management of osteoarthritis is symptomatic relief.  X-rays of the shoulder were taken today.  There is little to suggest that she has inflammatory arthropathy such as psoriatic arthritis.  Will meet here in the next 3 months.  Diagnoses and all orders for this visit:    Primary osteoarthritis involving multiple joints  -     XR Shoulders Bilateral 2 Or More Views; Future; Expected date: 9/27/18  -     XR Shoulders Bilateral 2 Or More Views    Chronic right shoulder pain  -     XR Shoulders Bilateral 2 Or More Views; Future; Expected date: 9/27/18  -     XR Shoulders Bilateral 2 Or More Views      HISTORY OF PRESENTING ILLNESS:  Ivis Cook, 72 y.o., female is here for evaluation of painful joints.  She reports that these have troubled her for several years as far back as 10.  She used to have quite a lot of pain in her knees.  She would have corticosteroid and other injections.  Finally in 2017 she had those replaced and has done well since.  Now she is complaining of pain in her hands, right shoulder.  Typically these are effort related.  She points to the PIPs and the DIPs.  She noted the pain level to be moderately severe.  This is more so on the right hand.  She considers herself an avid golfer.  This is interfering with her game and also gets worse when she plays.  The shoulder is troublesome only on the right side and that who is with activity.  She is not woken up from sleep because of either  the hand or the shoulder symptoms.  She has noted stiffness in the morning can be up to 30 minutes.  She has had low back pain, painful right toes.  She has noted easy bruising.  She is on Xarelto for pulmonary embolism and her thrombophilia would evidently require her to be on anticoagulation indefinitely.  She takes acetaminophen for her joint symptoms.  She is able to do all her day-to-day activities without difficulty.  She is not a smoker alcohol occasionally.  She has had bilateral cataract surgery.  She describes no family history of ulcerative colitis Crohn's disease rheumatoid arthritis ankylosing spondylitis.  She wonders if her father may have had psoriasis.  She had a Mohs procedure on her left lower extremity, the dressings would come off in the next few days.  She typically spends her cho in [place], and will be back in May.  Further historical information and ADL limitations as noted in the multidimensional health assessment questionnaire attached in the EMR. Rest of the 13 system ROS is negative.     ALLERGIES:Oxycontin [oxycodone]    PAST MEDICAL/ACTIVE PROBLEMS/MEDICATION/ FAMILY HISTORY/SOCIAL DATA:  The patient has a family history of  Past Medical History:   Diagnosis Date     Basal cell carcinoma of hand 2013     Basal cell carcinoma of leg 2012     Generalized osteoarthritis      Osteopenia      Synovial cyst popliteal space     Left      History   Smoking Status     Never Smoker   Smokeless Tobacco     Never Used     Patient Active Problem List   Diagnosis     Spondylosis     Osteopenia     Hammer Toe     Serum Total Cholesterol Was Elevated     Soft Tissue Pain In Lower Extremities     Contusion With Intact Skin Surface     Menopause Has Occurred     Spondylolisthesis     Groin (Inguinal) Pain     Synovial Cyst Of Popliteal Space     Osteoarthritis Of The Knee     Post-traumatic osteoarthritis of both knees     Meniscal injury, left, subsequent encounter 18months ago     Osteoarthritis  "of right knee     DVT (deep venous thrombosis) (H)     Primary osteoarthritis involving multiple joints     Chronic right shoulder pain     Current Outpatient Prescriptions   Medication Sig Dispense Refill     cholecalciferol, vitamin D3, 400 unit Tab Take 400 Units by mouth daily.       diphenhydrAMINE-acetaminophen (TYLENOL PM EXTRA STRENGTH)  mg Tab Take 1 tablet by mouth at bedtime as needed.       esomeprazole (NEXIUM) 40 MG capsule Take 1 capsule by mouth.       omega 3-dha-epa-fish oil (FISH OIL) 1,000 mg (120 mg-180 mg) cap Take by mouth.       POTASSIUM ORAL Take 1 tablet by mouth daily. otc med for leg cramps       rivaroxaban (XARELTO) 20 mg Tab Take by mouth.       No current facility-administered medications for this visit.        COMPREHENSIVE EXAMINATION:  Vitals:    09/27/18 0903   BP: 110/64   Patient Site: Right Arm   Patient Position: Sitting   Cuff Size: Adult Regular   Pulse: 82   Weight: 140 lb (63.5 kg)   Height: 5' 3\" (1.6 m)     A well appearing alert oriented female. Vital data as noted above. Her eyes without inflammation/scleromalacia. ENTwithout oral mucositis, thrush, nasal deformity, external ear redness, deformity. Her neck is without lymphadenopathy and supple. Lungs normal sounds, no pleural rub. Heart auscultation normal rate, rhythm; no pericardial rub and murmurs. Abdomen soft, non tender, no organomegaly. Skin examined for heliotrope, malar area eruption, lupus pernio, periungual erythema, sclerodactyly, papules, erythema nodosum, purpura, nail pitting, onycholysis, and obvious psoriasis lesion. Neurological examination shows normal alertness, speech, facial symmetry, tone and power in upper and lower extremities, Tinel's and Phalen's at wrist and gait. The joint examination is performed for swelling, tenderness, warmth, erythema, and range of motion in the following joints: DIPs, PIPs, MCPs, wrists, first CMC's, elbows, shoulders, hips, knees, ankles, feet; spine for " range of motion and paraspinal muscles for tenderness. The salient normal / abnormal findings are appended.  She has exuberant Heberden nodes, Olaf's.  Squaring of the first CMC's.  Bilateral TKAs.  She has a dressing on the left distal lower extremity.  She has minimal impingement of the right shoulder.  There is no synovitis in any of the palpable joints of upper extremity, lower extremity.  She does not have dactylitis of digits of the toes.    LAB / IMAGING DATA:  ALT   Date Value Ref Range Status   05/12/2017 20 0 - 45 U/L Final     Albumin   Date Value Ref Range Status   05/12/2017 4.1 3.5 - 5.0 g/dL Final     Creatinine   Date Value Ref Range Status   06/05/2017 0.73 0.60 - 1.10 mg/dL Final   05/12/2017 0.70 0.60 - 1.10 mg/dL Final   05/20/2014 0.73 0.60 - 1.10 mg/dL Final       WBC   Date Value Ref Range Status   06/05/2017 5.4 4.0 - 11.0 thou/uL Final   05/12/2017 5.9 4.0 - 11.0 thou/uL Final     Hemoglobin   Date Value Ref Range Status   06/07/2017 11.3 (L) 12.0 - 16.0 g/dL Final   06/06/2017 11.7 (L) 12.0 - 16.0 g/dL Final   06/05/2017 14.2 12.0 - 16.0 g/dL Final     Platelets   Date Value Ref Range Status   06/05/2017 267 140 - 440 thou/uL Final   05/12/2017 293 140 - 440 thou/uL Final   09/14/2010 274 140 - 440 thou/uL Final       Lab Results   Component Value Date    RF <15 05/05/2014

## 2021-06-21 NOTE — PROGRESS NOTES
ASSESSMENT AND PLAN:  Ivis Cook 73 y.o. female is here for increasing pain.  This is in her right shoulder consistent with tendinopathy of rotator cuff.  Various options she would like to proceed local injection.  She is already been doing various exercises.  With Marcaine and 40 mg of methylprednisolone injected into the subacromial space.  She had brisk Marcaine effect.  She is aware that the steroid benefit would take a few days.  For the winter she is going south, may visit [facility].  She is to return for follow-up here on as-needed basis.    Diagnoses and all orders for this visit:    Rotator cuff tendinitis, right  -     methylPREDNISolone acetate injection 40 mg (DEPO-MEDROL); Inject 1 mL (40 mg total) into the joint once.    Chronic right shoulder pain  -     methylPREDNISolone acetate injection 40 mg (DEPO-MEDROL); Inject 1 mL (40 mg total) into the joint once.    HISTORY OF PRESENTING ILLNESS:  Ivis Cook, 73 y.o., female is here for worsening pain.  She has noted this to be moderately severe.  This is on the right shoulder.  This interferes with some of the day-to-day activities.  It catches.  She noted sometimes radiation down the arm.  She has been woken up from sleep when she lays on that side.  This seems to be some variability in the movements that can cause the pain to get worse.  Typically the pain is more bothersome around the anterolateral aspect and sometimes even posterior aspect of the right shoulder.  Left shoulder does not trouble her significant way.  Morning stiffness is no more than 15 minutes no headaches no hip area cyst stiffness.  She noted no fever weight loss blurry vision eye redness mouth ulcer nausea cough or rash.  In the next 2-3 days [place] be [place].  As you know she is on Xarelto for pulmonary embolism, thrombophilia.  Is going to be an indefinite intervention.  She is not a candidate for regular nonsteroidal intake she has taken Tylenol.   In the past she has had replacement of the knee joints.  As noted before she has osteoarthritis in the background.  She is not a smoker alcohol occasionally.  She has had bilateral cataract surgery.  She describes no family history of ulcerative colitis Crohn's disease rheumatoid arthritis ankylosing spondylitis.  She wonders if her father may have had psoriasis.  She had a Mohs procedure on her left lower extremity, the dressings would come off in the next few days.  She typically spends her cho in [place], and will be back in May.  Further historical information and ADL limitations as noted in the multidimensional health assessment questionnaire attached in the EMR. ALLERGIES:Oxycontin [oxycodone]    PAST MEDICAL/ACTIVE PROBLEMS/MEDICATION/ FAMILY HISTORY/SOCIAL DATA:  The patient has a family history of  Past Medical History:   Diagnosis Date     Basal cell carcinoma of hand 2013     Basal cell carcinoma of leg 2012     Generalized osteoarthritis      Osteopenia      Synovial cyst popliteal space     Left      History   Smoking Status     Never Smoker   Smokeless Tobacco     Never Used     Patient Active Problem List   Diagnosis     Spondylosis     Osteopenia     Hammer Toe     Serum Total Cholesterol Was Elevated     Soft Tissue Pain In Lower Extremities     Contusion With Intact Skin Surface     Menopause Has Occurred     Spondylolisthesis     Groin (Inguinal) Pain     Synovial Cyst Of Popliteal Space     Osteoarthritis Of The Knee     Post-traumatic osteoarthritis of both knees     Meniscal injury, left, subsequent encounter 18months ago     Osteoarthritis of right knee     DVT (deep venous thrombosis) (H)     Primary osteoarthritis involving multiple joints     Chronic right shoulder pain     Current Outpatient Prescriptions   Medication Sig Dispense Refill     cholecalciferol, vitamin D3, 400 unit Tab Take 400 Units by mouth daily.       diphenhydrAMINE-acetaminophen (TYLENOL PM EXTRA STRENGTH)  mg  Tab Take 1 tablet by mouth at bedtime as needed.       esomeprazole (NEXIUM) 40 MG capsule Take 1 capsule by mouth.       omega 3-dha-epa-fish oil (FISH OIL) 1,000 mg (120 mg-180 mg) cap Take by mouth.       POTASSIUM ORAL Take 1 tablet by mouth daily. otc med for leg cramps       rivaroxaban (XARELTO) 20 mg Tab Take by mouth.       No current facility-administered medications for this visit.      DETAILED EXAMINATION  10/23/18  :  Vitals:    10/23/18 0753   BP: 142/64   Patient Site: Right Arm   Patient Position: Sitting   Cuff Size: Adult Regular   Pulse: 76   Weight: 140 lb (63.5 kg)     Alert oriented. Head including the face is examined for malar rash, heliotropes, scarring, lupus pernio. Eyes examined for redness such as in episcleritis/scleritis, periorbital lesions.   Neck/ Face examined for parotid gland swelling, range of motion of neck.  Left upper and lower and right upper and lower extremities examined for tenderness, swelling, warmth of the appendicular joints, range of motion, edema, rash.  Some of the important findings included: Impingement of the right shoulder.  Painful arc.  Bilateral TKAs.  Exuberant Heberden's.    LAB / IMAGING DATA:  ALT   Date Value Ref Range Status   05/12/2017 20 0 - 45 U/L Final     Albumin   Date Value Ref Range Status   05/12/2017 4.1 3.5 - 5.0 g/dL Final     Creatinine   Date Value Ref Range Status   06/05/2017 0.73 0.60 - 1.10 mg/dL Final   05/12/2017 0.70 0.60 - 1.10 mg/dL Final   05/20/2014 0.73 0.60 - 1.10 mg/dL Final       WBC   Date Value Ref Range Status   06/05/2017 5.4 4.0 - 11.0 thou/uL Final   05/12/2017 5.9 4.0 - 11.0 thou/uL Final     Hemoglobin   Date Value Ref Range Status   06/07/2017 11.3 (L) 12.0 - 16.0 g/dL Final   06/06/2017 11.7 (L) 12.0 - 16.0 g/dL Final   06/05/2017 14.2 12.0 - 16.0 g/dL Final     Platelets   Date Value Ref Range Status   06/05/2017 267 140 - 440 thou/uL Final   05/12/2017 293 140 - 440 thou/uL Final   09/14/2010 [id number] - 022  renan/Rick Final       Lab Results   Component Value Date    RF <15 05/05/2014

## 2021-06-25 NOTE — TELEPHONE ENCOUNTER
RN cannot approve Refill Request    RN can NOT refill this medication   Patient request early refill. Medication last filled 9/3/20 for qty 12 refill 3. Provider to advise on request. . Last office visit: 9/3/2020 Grant Burden MD Last Physical: Visit date not found Last MTM visit: Visit date not found Last visit same specialty: 9/3/2020 Grant Burden MD.  Next visit within 3 mo: Visit date not found  Next physical within 3 mo: Visit date not found      Oli Mendez, [facility] Triage/Med Refill 6/16/2021    Requested Prescriptions   Pending Prescriptions Disp Refills     alendronate (FOSAMAX) 70 MG tablet [Pharmacy Med Name: ALENDRONATE SODIUM 70 MG TAB] 12 tablet 3     Sig: TAKE 1 TABLET (70 MG TOTAL) BY MOUTH EVERY 7 DAYS. TAKE IN THE MORNING ON AN EMPTY STOMACH WITH A FULL GLASS OF WATER 30 MINUTES BEFORE FOOD       Biphosphonates Refill Protocol Passed - 6/16/2021  6:35 AM        Passed - PCP or prescribing provider visit in last 12 months     Last office visit with prescriber/PCP: 9/3/2020 Grant Burden MD OR same dept: 9/3/2020 Grant Burden MD OR same specialty: 9/3/2020 Grant Burden MD  Last physical: Visit date not found Last MTM visit: Visit date not found   Next visit within 3 mo: Visit date not found  Next physical within 3 mo: Visit date not found  Prescriber OR PCP: Grant Burden MD  Last diagnosis associated with med order: 1. Osteopenia with high risk of fracture  - alendronate (FOSAMAX) 70 MG tablet [Pharmacy Med Name: ALENDRONATE SODIUM 70 MG TAB]; Take 1 tablet (70 mg total) by mouth every 7 days. Take in the morning on an empty stomach with a full glass of water 30 minutes before food  Dispense: 12 tablet; Refill: 3    If protocol passes may refill for 12 months if within 3 months of last provider visit (or a total of 15 months).             Passed - Serum creatinine in last 12 months     Creatinine   Date Value Ref Range Status   06/16/2020 0.74 0.60 - 1.10 mg/dL Final

## 2021-06-25 NOTE — TELEPHONE ENCOUNTER
RN cannot approve Refill Request    RN can NOT refill this medication   Patient request early refill. Medication last filled 7/10/20 for qty 90 refill 3. Provider to advise on request. . Last office visit: Visit date not found Last Physical: Visit date not found Last MTM visit: Visit date not found Last visit same specialty: 9/1/2017 Caitlin Barragan FNP.  Next visit within 3 mo: Visit date not found  Next physical within 3 mo: Visit date not found      Oli Mendez, [facility] Connection Triage/Med Refill 6/16/2021    Requested Prescriptions   Pending Prescriptions Disp Refills     rosuvastatin (CRESTOR) 5 MG tablet [Pharmacy Med Name: ROSUVASTATIN CALCIUM 5 MG TAB] 90 tablet 3     Sig: TAKE 1 TABLET BY MOUTH EVERYDAY AT BEDTIME       Statins Refill Protocol (Hmg CoA Reductase Inhibitors) Passed - 6/16/2021  6:35 AM        Passed - PCP or prescribing provider visit in past 12 months      Last office visit with prescriber/PCP: Visit date not found OR same dept: Visit date not found OR same specialty: 9/1/2017 Caitlin Barragan FNP  Last physical: Visit date not found Last MTM visit: Visit date not found   Next visit within 3 mo: Visit date not found  Next physical within 3 mo: Visit date not found  Prescriber OR PCP: Addie Martinez MD  Last diagnosis associated with med order: 1. Serum Total Cholesterol Was Elevated  - rosuvastatin (CRESTOR) 5 MG tablet [Pharmacy Med Name: ROSUVASTATIN CALCIUM 5 MG TAB]; TAKE 1 TABLET BY MOUTH EVERYDAY AT BEDTIME  Dispense: 90 tablet; Refill: 3    If protocol passes may refill for 12 months if within 3 months of last provider visit (or a total of 15 months).

## 2021-06-26 ENCOUNTER — HEALTH MAINTENANCE LETTER (OUTPATIENT)
Age: 76
End: 2021-06-26

## 2021-07-13 ENCOUNTER — RECORDS - HEALTHEAST (OUTPATIENT)
Dept: ADMINISTRATIVE | Facility: CLINIC | Age: 76
End: 2021-07-13

## 2021-07-13 ENCOUNTER — TRANSFERRED RECORDS (OUTPATIENT)
Dept: HEALTH INFORMATION MANAGEMENT | Facility: CLINIC | Age: 76
End: 2021-07-13

## 2021-07-14 DIAGNOSIS — I10 BENIGN ESSENTIAL HYPERTENSION: Primary | ICD-10-CM

## 2021-07-18 NOTE — TELEPHONE ENCOUNTER
"losartan (COZAAR) 25 MG [id number]/8/2021NoSig - Route: Take 1 tablet (25 mg total) by mouth daily. - OralSent to pharmacy as: losartan 25 mg tablet (Cozaar)E-Prescribing Status: Receipt confirmed by pharmacy (1/8/2021 12:50 PM CST)     Routing refill request to provider for review/approval because:  Labs not current:  Multiple     Last Written Prescription Date:  1/8/21  Last Fill Quantity: 90,  # refills: 1   Last office visit provider:  9/3/20     Requested Prescriptions   Pending Prescriptions Disp Refills     losartan (COZAAR) 25 MG tablet [Pharmacy Med Name: LOSARTAN POTASSIUM 25 MG TAB] 90 tablet      Sig: TAKE 1 TABLET BY MOUTH EVERY DAY       Angiotensin-II Receptors Failed - 7/14/2021  1:30 PM        Failed - Medication is active on med list        Failed - Normal serum creatinine on file in past 12 months     Recent Labs   Lab Test 06/16/20  0916   CR 0.74       Ok to refill medication if creatinine is low          Failed - Normal serum potassium on file in past 12 months     Recent Labs   Lab Test 06/16/20  0916   POTASSIUM 4.6                    Passed - Last blood pressure under 140/90 in past 12 months     BP Readings from Last 3 Encounters:   09/04/20 128/79   09/03/20 118/64   06/16/20 132/88                 Passed - Recent (12 mo) or future (30 days) visit within the authorizing provider's specialty     Patient has had an office visit with the authorizing provider or a provider within the authorizing providers department within the previous 12 mos or has a future within next 30 days. See \"Patient Info\" tab in inbasket, or \"Choose Columns\" in Meds & Orders section of the refill encounter.              Passed - Patient is age 18 or older        Passed - No active pregnancy on record        Passed - No positive pregnancy test in past 12 months             Oli Mendez RN 07/18/21 2:05 PM  "

## 2021-07-19 RX ORDER — LOSARTAN POTASSIUM 25 MG/1
TABLET ORAL
Qty: 90 TABLET | Refills: 0 | Status: SHIPPED | OUTPATIENT
Start: 2021-07-19 | End: 2021-07-29

## 2021-07-19 NOTE — TELEPHONE ENCOUNTER
Looks like she has not been seen in some time.  Please help her set a physical with me.  I will give 90 days.  Thank you.

## 2021-07-21 ENCOUNTER — RECORDS - HEALTHEAST (OUTPATIENT)
Dept: ADMINISTRATIVE | Facility: CLINIC | Age: 76
End: 2021-07-21

## 2021-07-29 ENCOUNTER — OFFICE VISIT (OUTPATIENT)
Dept: FAMILY MEDICINE | Facility: CLINIC | Age: 76
End: 2021-07-29
Payer: MEDICARE

## 2021-07-29 VITALS
DIASTOLIC BLOOD PRESSURE: 79 MMHG | HEART RATE: 70 BPM | TEMPERATURE: 98.3 F | RESPIRATION RATE: 16 BRPM | BODY MASS INDEX: 23.4 KG/M2 | WEIGHT: 132.13 LBS | SYSTOLIC BLOOD PRESSURE: 125 MMHG

## 2021-07-29 DIAGNOSIS — Z12.31 VISIT FOR SCREENING MAMMOGRAM: ICD-10-CM

## 2021-07-29 DIAGNOSIS — I10 BENIGN ESSENTIAL HYPERTENSION: ICD-10-CM

## 2021-07-29 DIAGNOSIS — Z11.59 NEED FOR HEPATITIS C SCREENING TEST: Primary | ICD-10-CM

## 2021-07-29 PROCEDURE — 99213 OFFICE O/P EST LOW 20 MIN: CPT | Mod: 25 | Performed by: FAMILY MEDICINE

## 2021-07-29 PROCEDURE — 90714 TD VACC NO PRESV 7 YRS+ IM: CPT | Performed by: FAMILY MEDICINE

## 2021-07-29 PROCEDURE — 90471 IMMUNIZATION ADMIN: CPT | Performed by: FAMILY MEDICINE

## 2021-07-29 RX ORDER — ALENDRONATE SODIUM 70 MG/1
70 TABLET ORAL WEEKLY
COMMUNITY
Start: 2021-07-17 | End: 2021-09-04

## 2021-07-29 RX ORDER — LOSARTAN POTASSIUM 25 MG/1
12.5 TABLET ORAL DAILY
Qty: 90 TABLET | Refills: 0
Start: 2021-07-29 | End: 2021-12-03

## 2021-07-29 RX ORDER — ROSUVASTATIN CALCIUM 5 MG/1
TABLET, COATED ORAL
COMMUNITY
Start: 2021-04-19 | End: 2021-11-26

## 2021-07-29 NOTE — PROGRESS NOTES
ICD-10-CM    1. Need for hepatitis C screening test  Z11.59    2. Visit for screening mammogram  Z12.31 *MA Screening Digital Bilateral   3. Benign essential hypertension  I10 losartan (COZAAR) 25 MG tablet     Recommend 64-96 oz of non-caffenated fluid per day.    With your blood pressure being on the lower side I am going to have you decrease the losartan down to 12.5 mg daily which would be taking a half tab of the 25 mg daily.    Then monitor your blood pressure at home.  If it gets to 140 on top or 90 on the bottom let me know and we will have you go back up to the 25 mg of losartan daily.    We also want to know if it is under 110 on top or under 60 on the bottom.  Mammogram ordered.  They should call you but if they do not you can call [phone] to set that up.    Tetanus shot given today.    Dexa scan due June 2022.    We will help arrange a physical for end of September early October and please come fasting.  Between now and then we will get the colonoscopy report and see if you need another colonoscopy or Cologuard.    20 minutes spent with this patient including chart review, patient visit and documentation.    Subjective   Ivis is a 75 year old who presents for the following health issues:    HPI   Hypertension:  BP at home 118/50.  Has been a little lightheaded but also dieting.  When she bends over to put golf ball on te, will feel lightheaded fr 7-8 seconds.  Will not feel like seh will pass out.  She stays hydrated.    Osteoarthritis:  Feels better after starting Fosamax.    Osteopenia:  On Fosamax.  Did not quailfy for the Prolia.            Review of Systems               Objective    /79 (BP Location: Left arm, Patient Position: Sitting, Cuff Size: Adult Regular)   Pulse 70   Temp 98.3  F (36.8  C) (Oral)   Resp 16   Wt 59.9 kg (132 lb 2 oz)   BMI 23.40 kg/m    Body mass index is 23.4 kg/m .  Physical Exam   GENERAL: healthy, alert and no distress  RESP: lungs clear to  auscultation - no rales, rhonchi or wheezes  CV: regular rate and rhythm, normal S1 S2, no S3 or S4, no murmur, click or rub, no peripheral edema and peripheral pulses strong

## 2021-07-29 NOTE — PATIENT INSTRUCTIONS
Recommend 64-96 oz of non-caffenated fluid per day.    With your blood pressure being on the lower side I am going to have you decrease the losartan down to 12.5 mg daily which would be taking a half tab of the 25 mg daily.    Then monitor your blood pressure at home.  If it gets to 140 on top or 90 on the bottom let me know and we will have you go back up to the 25 mg of losartan daily.    We also want to know if it is under 110 on top or under 60 on the bottom.  Mammogram ordered.  They should call you but if they do not you can call [phone] to set that up.    Tetanus shot given today.    Dexa scan due June 2022.    We will help arrange a physical for end of September early October and please come fasting.  Between now and then we will get the colonoscopy report and see if you need another colonoscopy or Cologuard.

## 2021-08-03 ENCOUNTER — ANCILLARY PROCEDURE (OUTPATIENT)
Dept: MAMMOGRAPHY | Facility: CLINIC | Age: 76
End: 2021-08-03
Attending: FAMILY MEDICINE
Payer: MEDICARE

## 2021-08-03 DIAGNOSIS — Z12.31 VISIT FOR SCREENING MAMMOGRAM: ICD-10-CM

## 2021-08-03 PROBLEM — I82.409 DVT (DEEP VENOUS THROMBOSIS) (H): Status: RESOLVED | Noted: 2018-03-29 | Resolved: 2020-07-10

## 2021-08-03 PROCEDURE — 77063 BREAST TOMOSYNTHESIS BI: CPT

## 2021-08-21 ENCOUNTER — HEALTH MAINTENANCE LETTER (OUTPATIENT)
Age: 76
End: 2021-08-21

## 2021-09-03 DIAGNOSIS — M85.80 OTHER SPECIFIED DISORDERS OF BONE DENSITY AND STRUCTURE, UNSPECIFIED SITE: ICD-10-CM

## 2021-09-04 RX ORDER — ALENDRONATE SODIUM 70 MG/1
TABLET ORAL
Qty: 12 TABLET | Refills: 0 | Status: SHIPPED | OUTPATIENT
Start: 2021-09-04 | End: 2021-12-03

## 2021-09-04 NOTE — TELEPHONE ENCOUNTER
"Routing refill request to provider for review/approval because:  Labs not current:  creatinine    Last Written Prescription Date:  6/16/21  Last Fill Quantity: 12,  # refills: 0   Last office visit provider:  7/29/21     Requested Prescriptions   Pending Prescriptions Disp Refills     alendronate (FOSAMAX) 70 MG tablet [Pharmacy Med Name: ALENDRONATE SODIUM 70 MG TAB] 12 tablet      Sig: TAKE 1 TABLET (70 MG TOTAL) BY MOUTH EVERY 7 DAYS. TAKE IN THE MORNING ON AN EMPTY STOMACH WITH A FULL GLASS OF WATER 30 MINUTES BEFORE FOOD       Bisphosphonates Failed - 9/3/2021  8:30 AM        Failed - Normal serum creatinine on file within past 12 months     Recent Labs   Lab Test 06/16/20  0916   CR 0.74       Ok to refill medication if creatinine is low          Passed - Recent (12 mo) or future (30 days) visit within the authorizing provider's specialty     Patient has had an office visit with the authorizing provider or a provider within the authorizing providers department within the previous 12 mos or has a future within next 30 days. See \"Patient Info\" tab in inbasket, or \"Choose Columns\" in Meds & Orders section of the refill encounter.              Passed - Dexa on file within past 2 years     Please review last Dexa result.           Passed - Medication is active on med list        Passed - Patient is age 18 or older             Joslyn Kenny RN 09/03/21 9:39 PM  "

## 2021-09-10 ENCOUNTER — TRANSFERRED RECORDS (OUTPATIENT)
Dept: HEALTH INFORMATION MANAGEMENT | Facility: CLINIC | Age: 76
End: 2021-09-10

## 2021-10-16 ENCOUNTER — HEALTH MAINTENANCE LETTER (OUTPATIENT)
Age: 76
End: 2021-10-16

## 2022-03-22 DIAGNOSIS — E78.00 PURE HYPERCHOLESTEROLEMIA, UNSPECIFIED: ICD-10-CM

## 2022-03-22 RX ORDER — ROSUVASTATIN CALCIUM 5 MG/1
TABLET, COATED ORAL
Qty: 90 TABLET | Refills: 0 | Status: SHIPPED | OUTPATIENT
Start: 2022-03-22 | End: 2022-06-11

## 2022-03-22 NOTE — TELEPHONE ENCOUNTER
Reason for Call:  Medication or medication refill:    Do you use a [facility]?  Name of the pharmacy and phone number for the current request:  [facility]ACY #[id number] - [place], [place] - [address] [facility] [address]    Name of the medication requested: rosuvastatin (CRESTOR) 5 MG tablet    Other request: requesting 90 day supply so she will have enough until returns to [place]    Can we leave a detailed message on this number? YES    Phone number patient can be reached at: Cell number on file:    Telephone Information:   Mobile [phone]       Best Time: na    Call taken on 3/22/2022 at 8:28 AM by Manda Benton

## 2022-04-21 ENCOUNTER — TELEPHONE (OUTPATIENT)
Dept: LAB | Facility: CLINIC | Age: 77
End: 2022-04-21
Payer: MEDICARE

## 2022-04-21 DIAGNOSIS — I10 BENIGN ESSENTIAL HYPERTENSION: Primary | ICD-10-CM

## 2022-04-21 DIAGNOSIS — R79.9 ABNORMAL FINDING OF BLOOD CHEMISTRY, UNSPECIFIED: ICD-10-CM

## 2022-04-21 NOTE — PROGRESS NOTES
Patient coming in 4/25/2022 for fasting labs prior to her AWV on 4/27. Please review chart and place orders. Thanks!

## 2022-04-25 ENCOUNTER — LAB (OUTPATIENT)
Dept: LAB | Facility: CLINIC | Age: 77
End: 2022-04-25
Payer: MEDICARE

## 2022-04-25 DIAGNOSIS — Z11.59 NEED FOR HEPATITIS C SCREENING TEST: Primary | ICD-10-CM

## 2022-04-25 DIAGNOSIS — R79.9 ABNORMAL FINDING OF BLOOD CHEMISTRY, UNSPECIFIED: ICD-10-CM

## 2022-04-25 DIAGNOSIS — I10 BENIGN ESSENTIAL HYPERTENSION: ICD-10-CM

## 2022-04-25 LAB
CHOLEST SERPL-MCNC: 198 MG/DL
FASTING STATUS PATIENT QL REPORTED: YES
FASTING STATUS PATIENT QL REPORTED: YES
GLUCOSE BLD-MCNC: 96 MG/DL
HDLC SERPL-MCNC: 83 MG/DL
LDLC SERPL CALC-MCNC: 96 MG/DL
TRIGL SERPL-MCNC: 97 MG/DL

## 2022-04-25 PROCEDURE — 36415 COLL VENOUS BLD VENIPUNCTURE: CPT

## 2022-04-25 PROCEDURE — 80061 LIPID PANEL: CPT

## 2022-04-25 PROCEDURE — 82947 ASSAY GLUCOSE BLOOD QUANT: CPT

## 2022-04-25 PROCEDURE — 86803 HEPATITIS C AB TEST: CPT

## 2022-04-26 LAB — HCV AB SERPL QL IA: NONREACTIVE

## 2022-04-27 ENCOUNTER — OFFICE VISIT (OUTPATIENT)
Dept: FAMILY MEDICINE | Facility: CLINIC | Age: 77
End: 2022-04-27
Payer: MEDICARE

## 2022-04-27 VITALS
TEMPERATURE: 98.6 F | WEIGHT: 133.5 LBS | DIASTOLIC BLOOD PRESSURE: 77 MMHG | HEIGHT: 62 IN | SYSTOLIC BLOOD PRESSURE: 127 MMHG | BODY MASS INDEX: 24.56 KG/M2 | RESPIRATION RATE: 16 BRPM | HEART RATE: 73 BPM

## 2022-04-27 DIAGNOSIS — Z00.00 ENCOUNTER FOR ANNUAL WELLNESS EXAM IN MEDICARE PATIENT: Primary | ICD-10-CM

## 2022-04-27 DIAGNOSIS — E78.00 PURE HYPERCHOLESTEROLEMIA: ICD-10-CM

## 2022-04-27 DIAGNOSIS — M94.9 DISORDER OF BONE AND CARTILAGE: ICD-10-CM

## 2022-04-27 DIAGNOSIS — R19.7 DIARRHEA, UNSPECIFIED TYPE: ICD-10-CM

## 2022-04-27 DIAGNOSIS — I10 BENIGN ESSENTIAL HYPERTENSION: ICD-10-CM

## 2022-04-27 DIAGNOSIS — Z12.31 VISIT FOR SCREENING MAMMOGRAM: ICD-10-CM

## 2022-04-27 DIAGNOSIS — Z78.0 ASYMPTOMATIC POSTMENOPAUSAL STATUS: ICD-10-CM

## 2022-04-27 DIAGNOSIS — K20.0 EOSINOPHILIC ESOPHAGITIS: ICD-10-CM

## 2022-04-27 DIAGNOSIS — C44.611: ICD-10-CM

## 2022-04-27 DIAGNOSIS — R19.8 OTHER SPECIFIED SYMPTOMS AND SIGNS INVOLVING THE DIGESTIVE SYSTEM AND ABDOMEN: ICD-10-CM

## 2022-04-27 DIAGNOSIS — E55.9 VITAMIN D DEFICIENCY: ICD-10-CM

## 2022-04-27 DIAGNOSIS — Z85.828 HISTORY OF BASAL CELL CARCINOMA: ICD-10-CM

## 2022-04-27 DIAGNOSIS — M89.9 DISORDER OF BONE AND CARTILAGE: ICD-10-CM

## 2022-04-27 LAB
ALBUMIN SERPL-MCNC: 4.2 G/DL (ref 3.5–5)
ALP SERPL-CCNC: 41 U/L (ref 45–120)
ALT SERPL W P-5'-P-CCNC: 27 U/L (ref 0–45)
ANION GAP SERPL CALCULATED.3IONS-SCNC: 11 MMOL/L (ref 5–18)
AST SERPL W P-5'-P-CCNC: 28 U/L (ref 0–40)
BILIRUB SERPL-MCNC: 0.3 MG/DL (ref 0–1)
BUN SERPL-MCNC: 20 MG/DL (ref 8–28)
CALCIUM SERPL-MCNC: 10 MG/DL (ref 8.5–10.5)
CHLORIDE BLD-SCNC: 106 MMOL/L (ref 98–107)
CO2 SERPL-SCNC: 23 MMOL/L (ref 22–31)
CREAT SERPL-MCNC: 0.77 MG/DL (ref 0.6–1.1)
ERYTHROCYTE [DISTWIDTH] IN BLOOD BY AUTOMATED COUNT: 13.1 % (ref 10–15)
GFR SERPL CREATININE-BSD FRML MDRD: 79 ML/MIN/1.73M2
GLUCOSE BLD-MCNC: 101 MG/DL (ref 70–125)
HCT VFR BLD AUTO: 39.3 % (ref 35–47)
HGB BLD-MCNC: 13.2 G/DL (ref 11.7–15.7)
MCH RBC QN AUTO: 33.4 PG (ref 26.5–33)
MCHC RBC AUTO-ENTMCNC: 33.6 G/DL (ref 31.5–36.5)
MCV RBC AUTO: 100 FL (ref 78–100)
PLATELET # BLD AUTO: 307 10E3/UL (ref 150–450)
POTASSIUM BLD-SCNC: 4.5 MMOL/L (ref 3.5–5)
PROT SERPL-MCNC: 7 G/DL (ref 6–8)
RBC # BLD AUTO: 3.95 10E6/UL (ref 3.8–5.2)
SODIUM SERPL-SCNC: 140 MMOL/L (ref 136–145)
TSH SERPL DL<=0.005 MIU/L-ACNC: 1.05 UIU/ML (ref 0.3–5)
WBC # BLD AUTO: 6.1 10E3/UL (ref 4–11)

## 2022-04-27 PROCEDURE — 90471 IMMUNIZATION ADMIN: CPT | Performed by: FAMILY MEDICINE

## 2022-04-27 PROCEDURE — 90632 HEPA VACCINE ADULT IM: CPT | Performed by: FAMILY MEDICINE

## 2022-04-27 PROCEDURE — 86036 ANCA SCREEN EACH ANTIBODY: CPT | Mod: 90 | Performed by: FAMILY MEDICINE

## 2022-04-27 PROCEDURE — G0123 SCREEN CERV/VAG THIN LAYER: HCPCS | Performed by: FAMILY MEDICINE

## 2022-04-27 PROCEDURE — G0439 PPPS, SUBSEQ VISIT: HCPCS | Performed by: FAMILY MEDICINE

## 2022-04-27 PROCEDURE — 99000 SPECIMEN HANDLING OFFICE-LAB: CPT | Mod: GA | Performed by: FAMILY MEDICINE

## 2022-04-27 PROCEDURE — 80053 COMPREHEN METABOLIC PANEL: CPT | Performed by: FAMILY MEDICINE

## 2022-04-27 PROCEDURE — 82306 VITAMIN D 25 HYDROXY: CPT | Performed by: FAMILY MEDICINE

## 2022-04-27 PROCEDURE — 99213 OFFICE O/P EST LOW 20 MIN: CPT | Mod: 25 | Performed by: FAMILY MEDICINE

## 2022-04-27 PROCEDURE — 36415 COLL VENOUS BLD VENIPUNCTURE: CPT | Performed by: FAMILY MEDICINE

## 2022-04-27 PROCEDURE — 86671 FUNGUS NES ANTIBODY: CPT | Mod: 90 | Performed by: FAMILY MEDICINE

## 2022-04-27 PROCEDURE — 85027 COMPLETE CBC AUTOMATED: CPT | Performed by: FAMILY MEDICINE

## 2022-04-27 PROCEDURE — 87624 HPV HI-RISK TYP POOLED RSLT: CPT | Performed by: FAMILY MEDICINE

## 2022-04-27 PROCEDURE — 84443 ASSAY THYROID STIM HORMONE: CPT | Performed by: FAMILY MEDICINE

## 2022-04-27 RX ORDER — LOSARTAN POTASSIUM 25 MG/1
12.5 TABLET ORAL DAILY
Qty: 90 TABLET | Refills: 3
Start: 2022-04-27 | End: 2023-04-30

## 2022-04-27 RX ORDER — POTASSIUM CHLORIDE 750 MG/1
1 CAPSULE, EXTENDED RELEASE ORAL
COMMUNITY

## 2022-04-27 RX ORDER — CYCLOBENZAPRINE HCL 10 MG
10 TABLET ORAL 3 TIMES DAILY PRN
COMMUNITY
End: 2022-04-27

## 2022-04-27 RX ORDER — PREDNISONE 10 MG/1
TABLET ORAL EVERY 24 HOURS
COMMUNITY
End: 2022-04-27

## 2022-04-27 RX ORDER — GABAPENTIN 100 MG/1
100 CAPSULE ORAL 3 TIMES DAILY
COMMUNITY
Start: 2021-12-15 | End: 2022-04-27

## 2022-04-27 RX ORDER — VALACYCLOVIR HYDROCHLORIDE 1 G/1
TABLET, FILM COATED ORAL EVERY 12 HOURS
COMMUNITY
End: 2022-04-27

## 2022-04-27 ASSESSMENT — ACTIVITIES OF DAILY LIVING (ADL): CURRENT_FUNCTION: NO ASSISTANCE NEEDED

## 2022-04-27 NOTE — PATIENT INSTRUCTIONS
Hepatitis A shot #1 today.  You can set a nurse visit in 6 months for your second hepatitis A.    With your history of skin cancer I am glad you see dermatology yearly for full body skin checks.    Recommend 3 dairy servings a day or calcium with vitamin D twice a day with food.    With your symptoms of diarrhea for 6 months I am going to order a blood test for inflammatory bowel disease like ulcerative colitis or Crohn's.  The more formal diagnosis would be a colonoscopy but I do not want to order that without GIs approval.  I am referring you to [facility] in [place].  Please either have an in person or virtual visit 1 of one of the GI doctors to discuss your diarrhea and see what further testing they may need and if they recommend a colonoscopy or not.      Health Maintenance   Topic Date Due    URINE DRUG SCREEN  NA    DEXA  06/16/2022, ordered    ANNUAL REVIEW OF HM ORDERS  Today    FALL RISK ASSESSMENT  Discussed    MEDICARE ANNUAL WELLNESS VISIT  Today    LIPID  Done recently and normal    ADVANCE CARE PLANNING  We would like a copy please    DTAP/TDAP/TD IMMUNIZATION (3 - Td or Tdap) 07/29/2031    HEPATITIS C SCREENING  Completed    PHQ-2 (once per calendar year)  Completed    INFLUENZA VACCINE  Completed    Pneumococcal Vaccine: 65+ Years  Completed    ZOSTER IMMUNIZATION  Completed    COVID-19 Vaccine  Completed    IPV IMMUNIZATION  Aged Out    MENINGITIS IMMUNIZATION  Aged Out    HEPATITIS B IMMUNIZATION  Declined

## 2022-04-27 NOTE — PROGRESS NOTES
"SUBJECTIVE:   Ivis Cook is a 76 year old female who presents for Preventive Visit.      Patient has been advised of split billing requirements and indicates understanding: Yes  Are you in the first 12 months of your Medicare coverage?  No    Healthy Habits:     In general, how would you rate your overall health?  Excellent    Frequency of exercise:  4-5 days/week    Duration of exercise:  15-30 minutes    Do you usually eat at least 4 servings of fruit and vegetables a day, include whole grains    & fiber and avoid regularly eating high fat or \"junk\" foods?  Yes    Medication side effects:  None    Ability to successfully perform activities of daily living:  No assistance needed    Home Safety:  No safety concerns identified    Hearing Impairment:  No hearing concerns    In the past 6 months, have you been bothered by leaking of urine?  No    In general, how would you rate your overall mental or emotional health?  Excellent      PHQ-2 Total Score: 0    Additional concerns today:  No    Diarrhea:  For 6 months. Sometimes uncontrollable.  Cannot figure out why.  Has to take Imodium if going out.  Not related to foods.  No blood in stool. When it comes it is all at once. About 2 times a week.  Not feeling constipated.  Normal stool daily.  Using green powder that has fiber and veggies in it.  Does not feel her diarrhea is related to this product.  No family history of inflammatory bowel disease.  Not stress of related.  No depression or anxiety.  No fever or chills. Some incontinence of stool if she cannot make it to the bathroom in time.  Stomach starts to cramp and bubble and has to get quickly to bathroom.  Sometime w happens after eating and sometimes not.  Then very thirsty after having the diarrhea.  Per our records looks like possibly her last colonoscopy was 2013.    Osteopenia with high risk of fracture: She is currently on treatment and due for a bone density this summer.    Health maintenance: Patient " would like a Pap today.  She said she has had HPV and has not had a Pap in years.  She did have a total hysterectomy and tells me that the pathology was benign and she had endometriosis.  She does have a male partner.    Eosinophilic esophagitis: Patient had it looks like 2 endoscopies in 2018.  It looks like the eosinophilic esophagitis improved greatly on her proton pump inhibitor.  It was negative for celiac.    Hypertension: She is on 12.5 mg of Cozaar without side effects.  Blood pressure is well controlled.      Health Maintenance   Topic Date Due     URINE DRUG SCREEN  NA     DEXA  06/16/2022, ordered     ANNUAL REVIEW OF  ORDERS  Today     FALL RISK ASSESSMENT  Discussed     MEDICARE ANNUAL WELLNESS VISIT  Today     LIPID  Done recently and normal     ADVANCE CARE PLANNING  We would like a copy please     DTAP/TDAP/TD IMMUNIZATION (3 - Td or Tdap) 07/29/2031     HEPATITIS C SCREENING  Completed     PHQ-2 (once per calendar year)  Completed     INFLUENZA VACCINE  Completed     Pneumococcal Vaccine: 65+ Years  Completed     ZOSTER IMMUNIZATION  Completed     COVID-19 Vaccine  Completed     IPV IMMUNIZATION  Aged Out     MENINGITIS IMMUNIZATION  Aged Out     HEPATITIS B IMMUNIZATION  Declined       Do you feel safe in your environment? Yes    Have you ever done Advance Care Planning?  Yes, advance care planning is on file.       Fall risk  Fallen 2 or more times in the past year?: No  Any fall with injury in the past year?: No    Cognitive Screening   1) Repeat 3 items (Leader, Season, Table)    2) Clock draw: NORMAL  3) 3 item recall: Recalls 3 objects  Results: 3 items recalled: COGNITIVE IMPAIRMENT LESS LIKELY    Mini-CogTM Copyright S Alvarado. Licensed by the author for use in [facility]; reprinted with permission (doyle@.[facility]). All rights reserved.      Do you have sleep apnea, excessive snoring or daytime drowsiness?: no    Reviewed and updated as needed this visit by clinical staff    "Tobacco  Allergies  Meds                Reviewed and updated as needed this visit by Provider                   Social History     Tobacco Use     Smoking status: Never Smoker     Smokeless tobacco: Never Used   Substance Use Topics     Alcohol use: Yes     Alcohol/week: 3.3 standard drinks     Comment: Alcoholic Drinks/day: Occ         Alcohol Use 4/27/2022   Prescreen: >3 drinks/day or >7 drinks/week? Yes   Prescreen: >3 drinks/day or >7 drinks/week? -     AUDIT - Alcohol Use Disorders Identification Test - Reproduced from the World Health Organization Audit 2001 (Second Edition) 4/27/2022   1.  How often do you have a drink containing alcohol? 2 to 4 times a month   2.  How many drinks containing alcohol do you have on a typical day when you are drinking? 1 or 2   3.  How often do you have five or more drinks on one occasion? Never   9.  Have you or someone else been injured because of your drinking? No   10. Has a relative, friend, doctor or other health care worker been concerned about your drinking or suggested you cut down? No               Current providers sharing in care for this patient include:   Patient Care Team:  Addie Martinez MD as PCP - Emma Haque MD, MD as MD (Rheumatology)  OsirisFadia koroma MD as MD (Family Practice)  Addie Martinez MD as Assigned PCP    The following health maintenance items are reviewed in Epic and correct as of today:  Health Maintenance Due   Topic Date Due     URINE DRUG SCREEN  Never done     Lab work is in process  See orders    Mammogram Screening: Recommended mammography every 1-2 years with patient discussion and risk factor consideration  Pertinent mammograms are reviewed under the imaging tab.    Review of Systems      OBJECTIVE:   /77 (BP Location: Left arm, Patient Position: Sitting, Cuff Size: Adult Regular)   Pulse 73   Temp 98.6  F (37  C) (Oral)   Resp 16   Ht 1.575 m (5' 2\")   Wt 60.6 kg (133 lb 8 oz)  " " BMI 24.42 kg/m   Estimated body mass index is 24.42 kg/m  as calculated from the following:    Height as of this encounter: 1.575 m (5' 2\").    Weight as of this encounter: 60.6 kg (133 lb 8 oz).  Physical Exam  GENERAL: healthy, alert and no distress  EYES: Eyes grossly normal to inspection, PERRL and conjunctivae and sclerae normal  HENT: ear canals and TM's normal, nose and mouth without ulcers or lesions  NECK: no adenopathy, no asymmetry, masses, or scars and thyroid normal to palpation  RESP: lungs clear to auscultation - no rales, rhonchi or wheezes  BREAST: normal without masses, tenderness or nipple discharge and no palpable axillary masses or adenopathy  CV: regular rate and rhythm, normal S1 S2, no S3 or S4, no murmur, click or rub, no peripheral edema and peripheral pulses strong  ABDOMEN: soft, nontender, no hepatosplenomegaly, no masses and bowel sounds normal   (female): normal female external genitalia, normal urethral meatus, vaginal mucosa pink, moist, well rugated, cervix uterus and ovaries are absent  MS: no gross musculoskeletal defects noted, no edema  SKIN: no suspicious lesions or rashes  NEURO: Normal strength and tone, mentation intact and speech normal  PSYCH: mentation appears normal, affect normal/bright    Diagnostic Test Results:  Labs reviewed in Epic    ASSESSMENT / PLAN:       ICD-10-CM    1. Encounter for annual wellness exam in Medicare patient  Z00.00 Pap screen with HPV - recommended age 30 - 65 years   2. Benign essential hypertension  I10 Comprehensive metabolic panel     CBC with platelets     losartan (COZAAR) 25 MG tablet     Comprehensive metabolic panel     CBC with platelets   3. Vitamin D deficiency  E55.9 Vitamin D Deficiency     Vitamin D Deficiency   4. Serum Total Cholesterol Was Elevated  E78.00    5. Basal cell carcinoma of hand, unspecified laterality  C44.611    6. History of basal cell carcinoma  Z85.828    7. Menopause Has Occurred  Z78.0 DX " "Hip/Pelvis/Spine     DX Hip/Pelvis/Spine   8. Osteopenia  M89.9 TSH with free T4 reflex    M94.9 TSH with free T4 reflex   9. Eosinophilic esophagitis  K20.0    10. Visit for screening mammogram  Z12.31 *MA Screening Digital Bilateral   11. Diarrhea, unspecified type  R19.7 Inflammatory Bowel Disease     Adult Gastro Ref - Consult Only     Inflammatory Bowel Disease   12. Other specified symptoms and signs involving the digestive system and abdomen   R19.8 TSH with free T4 reflex     TSH with free T4 reflex     Hepatitis A shot #1 today.  You can set a nurse visit in 6 months for your second hepatitis A.    With your history of skin cancer I am glad you see dermatology yearly for full body skin checks.    Recommend 3 dairy servings a day or calcium with vitamin D twice a day with food.    With your symptoms of diarrhea for 6 months I am going to order a blood test for inflammatory bowel disease like ulcerative colitis or Crohn's.  The more formal diagnosis would be a colonoscopy but I do not want to order that without GIs approval.  I am referring you to [facility] in [place].  Please either have an in person or virtual visit 1 of one of the GI doctors to discuss your diarrhea and see what further testing they may need and if they recommend a colonoscopy or not.    Patient has been advised of split billing requirements and indicates understanding: Yes    COUNSELING:  Reviewed preventive health counseling, as reflected in patient instructions       Osteoporosis prevention/bone health    Estimated body mass index is 24.42 kg/m  as calculated from the following:    Height as of this encounter: 1.575 m (5' 2\").    Weight as of this encounter: 60.6 kg (133 lb 8 oz).        She reports that she has never smoked. She has never used smokeless tobacco.      Appropriate preventive services were discussed with this patient, including applicable screening as appropriate for cardiovascular disease, diabetes, " osteopenia/osteoporosis, and glaucoma.  As appropriate for age/gender, discussed screening for colorectal cancer, prostate cancer, breast cancer, and cervical cancer. Checklist reviewing preventive services available has been given to the patient.    Reviewed patients plan of care and provided an AVS. The Basic Care Plan (routine screening as documented in Health Maintenance) for Ivis meets the Care Plan requirement. This Care Plan has been established and reviewed with the Patient.    Counseling Resources:  ATP IV Guidelines  Pooled Cohorts Equation Calculator  Breast Cancer Risk Calculator  Breast Cancer: Medication to Reduce Risk  FRAX Risk Assessment  ICSI Preventive Guidelines  Dietary Guidelines for Americans, 2010  USDA's MyPlate  ASA Prophylaxis  Lung CA Screening    Addie Martinez MD  [facility]    Identified Health Risks:

## 2022-04-28 LAB — DEPRECATED CALCIDIOL+CALCIFEROL SERPL-MC: 40 UG/L (ref 20–75)

## 2022-04-29 LAB
HUMAN PAPILLOMA VIRUS 16 DNA: NEGATIVE
HUMAN PAPILLOMA VIRUS 18 DNA: NEGATIVE
HUMAN PAPILLOMA VIRUS FINAL DIAGNOSIS: NORMAL
HUMAN PAPILLOMA VIRUS OTHER HR: NEGATIVE

## 2022-05-02 LAB
ANCA AB PATTERN SER IF-IMP: NORMAL
ANCA IGG TITR SER IF: NORMAL {TITER}
BAKER'S YEAST IGA QN IA: 5.2 UNITS
BAKER'S YEAST IGG QN IA: 4.5 UNITS
PATHOLOGY STUDY: NORMAL
TEST PERFORMANCE INFO SPEC: NORMAL

## 2022-05-04 LAB
BKR LAB AP GYN ADEQUACY: NORMAL
BKR LAB AP GYN INTERPRETATION: NORMAL
BKR LAB AP HPV REFLEX: NORMAL
BKR LAB AP PREVIOUS ABNL DX: NORMAL
BKR LAB AP PREVIOUS ABNORMAL: NORMAL
PATH REPORT.COMMENTS IMP SPEC: NORMAL
PATH REPORT.COMMENTS IMP SPEC: NORMAL
PATH REPORT.RELEVANT HX SPEC: NORMAL

## 2022-05-23 ENCOUNTER — TRANSFERRED RECORDS (OUTPATIENT)
Dept: HEALTH INFORMATION MANAGEMENT | Facility: CLINIC | Age: 77
End: 2022-05-23
Payer: MEDICARE

## 2022-06-03 DIAGNOSIS — I10 ESSENTIAL (PRIMARY) HYPERTENSION: ICD-10-CM

## 2022-06-05 RX ORDER — LISINOPRIL 5 MG/1
TABLET ORAL
Qty: 90 TABLET | Refills: 3 | Status: SHIPPED | OUTPATIENT
Start: 2022-06-05 | End: 2023-04-12

## 2022-06-05 NOTE — TELEPHONE ENCOUNTER
"Routing refill request to provider for review/approval because:  Drug not active on patient's medication list    Last Written Prescription Date:    Last Fill Quantity: ,  # refills:    Last office visit provider:  4/27/22     Requested Prescriptions   Pending Prescriptions Disp Refills     lisinopril (ZESTRIL) 5 MG tablet [Pharmacy Med Name: LISINOPRIL 5 MG TABLET] 90 tablet 3     Sig: TAKE 1 TABLET BY MOUTH EVERY DAY       ACE Inhibitors (Including Combos) Protocol Failed - 6/3/2022 12:14 PM        Failed - Medication is active on med list        Passed - Blood pressure under 140/90 in past 12 months     BP Readings from Last 3 Encounters:   04/27/22 127/77   07/29/21 125/79   09/04/20 128/79                 Passed - Recent (12 mo) or future (30 days) visit within the authorizing provider's specialty     Patient has had an office visit with the authorizing provider or a provider within the authorizing providers department within the previous 12 mos or has a future within next 30 days. See \"Patient Info\" tab in inbasket, or \"Choose Columns\" in Meds & Orders section of the refill encounter.              Passed - Patient is age 18 or older        Passed - No active pregnancy on record        Passed - Normal serum creatinine on file in past 12 months     Recent Labs   Lab Test 04/27/22  1720   CR 0.77       Ok to refill medication if creatinine is low          Passed - Normal serum potassium on file in past 12 months     Recent Labs   Lab Test 04/27/22  1720   POTASSIUM 4.5             Passed - No positive pregnancy test within past 12 months             Yuliya Bustos RN 06/04/22 11:25 PM  "

## 2022-06-11 DIAGNOSIS — E78.00 PURE HYPERCHOLESTEROLEMIA, UNSPECIFIED: ICD-10-CM

## 2022-06-11 RX ORDER — ROSUVASTATIN CALCIUM 5 MG/1
TABLET, COATED ORAL
Qty: 90 TABLET | Refills: 2 | Status: SHIPPED | OUTPATIENT
Start: 2022-06-11 | End: 2023-02-13

## 2022-06-12 NOTE — TELEPHONE ENCOUNTER
"Last Written Prescription Date:  3/22/22  Last Fill Quantity: 90,  # refills: 0   Last office visit provider:  4/27/22     Requested Prescriptions   Pending Prescriptions Disp Refills     rosuvastatin (CRESTOR) 5 MG tablet [Pharmacy Med Name: ROSUVASTATIN CALCIUM 5 MG TAB] 90 tablet 0     Sig: TAKE 1 TABLET BY MOUTH EVERYDAY AT BEDTIME       Statins Protocol Passed - 6/11/2022  8:36 AM        Passed - LDL on file in past 12 months     Recent Labs   Lab Test 04/25/22  0922   LDL 96             Passed - No abnormal creatine kinase in past 12 months     No lab results found.             Passed - Recent (12 mo) or future (30 days) visit within the authorizing provider's specialty     Patient has had an office visit with the authorizing provider or a provider within the authorizing providers department within the previous 12 mos or has a future within next 30 days. See \"Patient Info\" tab in inbasket, or \"Choose Columns\" in Meds & Orders section of the refill encounter.              Passed - Medication is active on med list        Passed - Patient is age 18 or older        Passed - No active pregnancy on record        Passed - No positive pregnancy test in past 12 months             Yuliya Bustos, RN 06/11/22 7:56 PM  "

## 2022-08-05 ENCOUNTER — HOSPITAL ENCOUNTER (OUTPATIENT)
Dept: MAMMOGRAPHY | Facility: CLINIC | Age: 77
Discharge: HOME | End: 2022-08-05
Attending: FAMILY MEDICINE | Admitting: FAMILY MEDICINE
Payer: MEDICARE

## 2022-08-05 ENCOUNTER — ANCILLARY PROCEDURE (OUTPATIENT)
Dept: BONE DENSITY | Facility: CLINIC | Age: 77
End: 2022-08-05
Attending: FAMILY MEDICINE
Payer: MEDICARE

## 2022-08-05 DIAGNOSIS — Z78.0 POST-MENOPAUSAL: ICD-10-CM

## 2022-08-05 DIAGNOSIS — Z12.31 VISIT FOR SCREENING MAMMOGRAM: ICD-10-CM

## 2022-08-05 PROCEDURE — 77067 SCR MAMMO BI INCL CAD: CPT

## 2022-08-05 PROCEDURE — 77080 DXA BONE DENSITY AXIAL: CPT | Mod: TC | Performed by: RADIOLOGY

## 2022-08-05 PROCEDURE — 77081 DXA BONE DENSITY APPENDICULR: CPT | Mod: TC | Performed by: RADIOLOGY

## 2022-08-09 ENCOUNTER — TELEPHONE (OUTPATIENT)
Dept: FAMILY MEDICINE | Facility: CLINIC | Age: 77
End: 2022-08-09

## 2022-08-09 DIAGNOSIS — M89.9 DISORDER OF BONE AND CARTILAGE: Primary | ICD-10-CM

## 2022-08-09 DIAGNOSIS — M94.9 DISORDER OF BONE AND CARTILAGE: Primary | ICD-10-CM

## 2022-08-09 NOTE — TELEPHONE ENCOUNTER
----- Message from Addie Martinez MD sent at 8/8/2022  7:26 PM CDT -----  Please call patient.  Bone density shows low bone mass and a higher risk of fracture.  Per chart she is on the alendronate.  The start date looks like December 2021.  Please ask if she has been on this med longer than that?  If she has been on this med for a year or 2 we should have her visit with an osteoporosis specialist about changing medications.  If she is only been on it for 7 to 8 months we can give it some more time.  Please let me know.

## 2022-08-09 NOTE — TELEPHONE ENCOUNTER
Pt states she has been on the alendronate for 2 years, would like to see osteoporosis specialist to discuss other treatment options. Please advise.

## 2022-08-09 NOTE — TELEPHONE ENCOUNTER
Left message to call back for: Results  Information to relay to patient: LMTCB, Please see message below from provider.

## 2022-09-16 ENCOUNTER — OFFICE VISIT (OUTPATIENT)
Dept: ENDOCRINOLOGY | Facility: CLINIC | Age: 77
End: 2022-09-16
Attending: FAMILY MEDICINE
Payer: MEDICARE

## 2022-09-16 VITALS
WEIGHT: 133 LBS | SYSTOLIC BLOOD PRESSURE: 161 MMHG | HEART RATE: 68 BPM | BODY MASS INDEX: 24.48 KG/M2 | HEIGHT: 62 IN | DIASTOLIC BLOOD PRESSURE: 89 MMHG

## 2022-09-16 DIAGNOSIS — Z79.83 GASTROINTESTINAL INTOLERANCE TO BISPHOSPHONATES: ICD-10-CM

## 2022-09-16 DIAGNOSIS — K90.49 GASTROINTESTINAL INTOLERANCE TO BISPHOSPHONATES: ICD-10-CM

## 2022-09-16 DIAGNOSIS — K20.0 EOSINOPHILIC ESOPHAGITIS: ICD-10-CM

## 2022-09-16 DIAGNOSIS — M94.9 DISORDER OF BONE AND CARTILAGE: Primary | ICD-10-CM

## 2022-09-16 DIAGNOSIS — M89.9 DISORDER OF BONE AND CARTILAGE: Primary | ICD-10-CM

## 2022-09-16 DIAGNOSIS — M81.0 AGE-RELATED OSTEOPOROSIS WITHOUT CURRENT PATHOLOGICAL FRACTURE: ICD-10-CM

## 2022-09-16 DIAGNOSIS — M81.8 OTHER OSTEOPOROSIS WITHOUT CURRENT PATHOLOGICAL FRACTURE: ICD-10-CM

## 2022-09-16 PROCEDURE — 99204 OFFICE O/P NEW MOD 45 MIN: CPT | Performed by: INTERNAL MEDICINE

## 2022-09-16 RX ORDER — EPINEPHRINE 1 MG/ML
0.3 INJECTION, SOLUTION, CONCENTRATE INTRAVENOUS EVERY 5 MIN PRN
Status: CANCELLED | OUTPATIENT
Start: 2022-09-16

## 2022-09-16 RX ORDER — ALBUTEROL SULFATE 0.83 MG/ML
2.5 SOLUTION RESPIRATORY (INHALATION)
Status: CANCELLED | OUTPATIENT
Start: 2022-09-16

## 2022-09-16 RX ORDER — DIPHENHYDRAMINE HYDROCHLORIDE 50 MG/ML
50 INJECTION INTRAMUSCULAR; INTRAVENOUS
Status: CANCELLED
Start: 2022-09-16

## 2022-09-16 RX ORDER — MEPERIDINE HYDROCHLORIDE 25 MG/ML
25 INJECTION INTRAMUSCULAR; INTRAVENOUS; SUBCUTANEOUS EVERY 30 MIN PRN
Status: CANCELLED | OUTPATIENT
Start: 2022-09-16

## 2022-09-16 RX ORDER — ALBUTEROL SULFATE 90 UG/1
1-2 AEROSOL, METERED RESPIRATORY (INHALATION)
Status: CANCELLED
Start: 2022-09-16

## 2022-09-16 RX ORDER — METHYLPREDNISOLONE SODIUM SUCCINATE 125 MG/2ML
125 INJECTION, POWDER, LYOPHILIZED, FOR SOLUTION INTRAMUSCULAR; INTRAVENOUS
Status: CANCELLED
Start: 2022-09-16

## 2022-09-16 NOTE — LETTER
9/16/2022         RE: Ivis Cook  [address]  [place] [address]        Dear Colleague,    Thank you for referring your patient, Ivis Cook, to the [facility] ENDOCRINOLOGY. Please see a copy of my visit note below.    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  Unclear if this was drug ineffectiveness or malabsorption given her hx of GI issues (although they are subjectively resolved at this time)    Plan:  -recommend switching to Prolia q6 months as a result of bisphosphonate treatment failure  -discontinue alendronate  -will order prolia for [facility] infusion as this is closer for patient  -discussed q6 month schedule - pt likely to get first dose here and second dose may need to be out of state where she travels from late Oct to [place].  Discussed how she can coordinate with her physician there about second administration  -increase calcium supplement to BID  -continue current vitamin D supplement    I have independently reviewed and interpreted labs, imaging as indicated.      RTC 1 year     Chief complaint:  Ivis is a 76 year old female seen in consultation at the request of  for osteoporosis.     HISTORY OF PRESENT ILLNESS    Ivis is a 76-year-old female with past medical history significant for eosinophilic esophagitis, and osteoporosis.    She was started on Fosamax 2 years ago and osteoporosis diagnosed by DEXA.  Follow-up DEXA done recently showed worsening of T score in the left femoral neck from -1.7 to -2.  Importantly it also showed a worsening of major osteoporotic  fracture risk from 12.7% to 15.3%, and hip fracture risk from 3.2% to 4.5%.  This was while taking alendronate on a weekly basis.    She has not had any fractures or falls since starting therapy.  No intolerance to alendronate otherwise.    She currently takes vitamin D3 2000 units once daily.  She also takes calcium supplement once daily.  Does not frequently consume dairy.    Of note she will be going out of state at the end of October and will be returning in April.  We discussed this in the setting of potential Prolia use.    Endocrine relevant labs are as follows:  Normal kidney function  Normal vitamin D level    Relevant imaging is as follows: (as read by me as it pertains to endocrine relevant organs)     RIGHT Hip Total: BMD: 0.883 g/cm2. T-score: -1.0. Z-score: 0.9  RIGHT Hip Femoral neck: BMD: 0.905 g/cm2. T-score: -1.0. Z-score: 1.1  LEFT Hip Total: BMD: 0.841 g/cm2. T-score: -1.3. Z-score: 0.6  LEFT Hip Femoral neck: BMD: 0.759 g/cm2. T-score: -2.0. Z-score: 0.1  LEFT Radius 33%: BMD: 0.769 g/cm2. T-score: -1.2. Z-score: 1.4     WHO Criteria:  Normal: T score at or above -1 SD  Osteopenia: T score between -1 and -2.5 SD  Osteoporosis: T score at or below -2.5 SD     FRAX Results: 10 year probability of major osteoporotic fracture is 15.3%, and of hip fracture is 4.5%, based on left femoral neck BMD.    Prior Major risk 2 years prior was 12.7% and hip fx risk 3.2%    REVIEW OF SYSTEMS    10 system ROS otherwise as per the HPI or negative    Past Medical History  Past Medical History:   Diagnosis Date     Basal cell carcinoma of hand 01/01/2013     Basal cell carcinoma of leg 01/01/2012     DVT (deep venous thrombosis) (H) 03/29/2018    Not seen but assumed since PE     Eosinophilic esophagitis      Generalized osteoarthritis      HPV in female      Osteopenia      Pulmonary emboli (H) 03/01/2018     Synovial cyst popliteal space     Left        Medications  Current Outpatient Medications   Medication Sig  "Dispense Refill     alendronate (FOSAMAX) 70 MG tablet TAKE 1 TABLET (70 MG TOTAL) BY MOUTH EVERY 7 DAYS. TAKE IN THE MORNING ON AN EMPTY STOMACH WITH A FULL GLASS OF WATER 30 MINUTES BEFORE FOOD 12 tablet 3     BIOTIN PO biotin       Cholecalciferol (VITAMIN D3 PO) Take by mouth daily       Collagen Hydrolysate, Bovine, POWD collagen (bovine)       esomeprazole (NEXIUM) 20 MG DR capsule Take 20 mg by mouth every morning (before breakfast) Take 30-60 minutes before eating.       lisinopril (ZESTRIL) 5 MG tablet TAKE 1 TABLET BY MOUTH EVERY DAY 90 tablet 3     losartan (COZAAR) 25 MG tablet Take 0.5 tablets (12.5 mg) by mouth daily 90 tablet 3     MAGNESIUM GLUCONATE PO Take 200 mg by mouth       Omega-3 Fatty Acids (FISH OIL PO) Fish Oil       Oyster Shell (OYSTER CALCIUM PO) Take 1,000 mg by mouth daily       potassium chloride ER (MICRO-K) 10 MEQ CR capsule Take 1 tablet by mouth       rosuvastatin (CRESTOR) 5 MG tablet TAKE 1 TABLET BY MOUTH EVERYDAY AT BEDTIME 90 tablet 2     UNABLE TO FIND daily MEDICATION NAME: tumeric       VITAMIN E COMPLEX PO Take by mouth daily         Allergies  Allergies   Allergen Reactions     Lisinopril Cough     Currently taking-low dose         Family History  family history includes Cerebrovascular Disease in her mother; Lung Cancer in her father.    Social History  Social History     Tobacco Use     Smoking status: Never Smoker     Smokeless tobacco: Never Used   Substance Use Topics     Alcohol use: Yes     Alcohol/week: 3.3 standard drinks     Comment: Alcoholic Drinks/day: Occ     Drug use: No       Physical Exam  BP (!) 161/89 (BP Location: Right arm, Patient Position: Sitting, Cuff Size: Adult Regular)   Pulse 68   Ht 1.575 m (5' 2\")   Wt 60.3 kg (133 lb)   BMI 24.33 kg/m    Body mass index is 24.33 kg/m .    Physical Exam    GENERAL :  In no apparent distress  SKIN: Normal color, normal temperature     EYES: EOMI, No scleral icterus  NECK: No visible masses  RESP: No " respiratory distress, normal effort  CARDIO: regular rate  ABDOMEN: flat, nondistended       NEURO: awake, alert, responds appropriately to questions   EXTREMITIES: No clubbing, cyanosis or edema.    I spent 45 minutes on today's visit in chart review, review of imaging, review of labs, exam, coordinating care, and in counseling with patient.        Again, thank you for allowing me to participate in the care of your patient.        Sincerely,        Ollie Ro MD

## 2022-09-16 NOTE — NURSING NOTE
Chief Complaint   Patient presents with     Endocrine Problem     Decreased bone density on DXA.     Jolanta Kennedy LPN on 9/16/2022 at 8:52 AM

## 2022-09-16 NOTE — PROGRESS NOTES
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  Unclear if this was drug ineffectiveness or malabsorption given her hx of GI issues (although they are subjectively resolved at this time)    Plan:  -recommend switching to Prolia q6 months as a result of bisphosphonate treatment failure  -discontinue alendronate  -will order prolia for [facility] infusion as this is closer for patient  -discussed q6 month schedule - pt likely to get first dose here and second dose may need to be out of state where she travels from [place] to [place].  Discussed how she can coordinate with her physician there about second administration  -increase calcium supplement to BID  -continue current vitamin D supplement    I have independently reviewed and interpreted labs, imaging as indicated.      RTC 1 year     Chief complaint:  Ivis is a 76 year old female seen in consultation at the request of  for osteoporosis.     HISTORY OF PRESENT ILLNESS    Ivis is a 76-year-old female with past medical history significant for eosinophilic esophagitis, and osteoporosis.    She was started on Fosamax 2 years ago and osteoporosis diagnosed by DEXA.  Follow-up DEXA done recently showed worsening of T score in the left femoral neck from -1.7 to -2.  Importantly it also showed a worsening of major osteoporotic fracture risk from 12.7% to 15.3%, and hip fracture risk from 3.2% to 4.5%.  This was while taking alendronate on a weekly basis.    She has not had any fractures or falls since starting therapy.  No intolerance to alendronate otherwise.    She currently takes  vitamin D3 2000 units once daily.  She also takes calcium supplement once daily.  Does not frequently consume dairy.    Of note she will be going out of state at the end of October and will be returning in April.  We discussed this in the setting of potential Prolia use.    Endocrine relevant labs are as follows:  Normal kidney function  Normal vitamin D level    Relevant imaging is as follows: (as read by me as it pertains to endocrine relevant organs)     RIGHT Hip Total: BMD: 0.883 g/cm2. T-score: -1.0. Z-score: 0.9  RIGHT Hip Femoral neck: BMD: 0.905 g/cm2. T-score: -1.0. Z-score: 1.1  LEFT Hip Total: BMD: 0.841 g/cm2. T-score: -1.3. Z-score: 0.6  LEFT Hip Femoral neck: BMD: 0.759 g/cm2. T-score: -2.0. Z-score: 0.1  LEFT Radius 33%: BMD: 0.769 g/cm2. T-score: -1.2. Z-score: 1.4     WHO Criteria:  Normal: T score at or above -1 SD  Osteopenia: T score between -1 and -2.5 SD  Osteoporosis: T score at or below -2.5 SD     FRAX Results: 10 year probability of major osteoporotic fracture is 15.3%, and of hip fracture is 4.5%, based on left femoral neck BMD.    Prior Major risk 2 years prior was 12.7% and hip fx risk 3.2%    REVIEW OF SYSTEMS    10 system ROS otherwise as per the HPI or negative    Past Medical History  Past Medical History:   Diagnosis Date     Basal cell carcinoma of hand 01/01/2013     Basal cell carcinoma of leg 01/01/2012     DVT (deep venous thrombosis) (H) 03/29/2018    Not seen but assumed since PE     Eosinophilic esophagitis      Generalized osteoarthritis      HPV in female      Osteopenia      Pulmonary emboli (H) 03/01/2018     Synovial cyst popliteal space     Left        Medications  Current Outpatient Medications   Medication Sig Dispense Refill     alendronate (FOSAMAX) 70 MG tablet TAKE 1 TABLET (70 MG TOTAL) BY MOUTH EVERY 7 DAYS. TAKE IN THE MORNING ON AN EMPTY STOMACH WITH A FULL GLASS OF WATER 30 MINUTES BEFORE FOOD 12 tablet 3     BIOTIN PO biotin       Cholecalciferol (VITAMIN  "D3 PO) Take by mouth daily       Collagen Hydrolysate, Bovine, POWD collagen (bovine)       esomeprazole (NEXIUM) 20 MG DR capsule Take 20 mg by mouth every morning (before breakfast) Take 30-60 minutes before eating.       lisinopril (ZESTRIL) 5 MG tablet TAKE 1 TABLET BY MOUTH EVERY DAY 90 tablet 3     losartan (COZAAR) 25 MG tablet Take 0.5 tablets (12.5 mg) by mouth daily 90 tablet 3     MAGNESIUM GLUCONATE PO Take 200 mg by mouth       Omega-3 Fatty Acids (FISH OIL PO) Fish Oil       Oyster Shell (OYSTER CALCIUM PO) Take 1,000 mg by mouth daily       potassium chloride ER (MICRO-K) 10 MEQ CR capsule Take 1 tablet by mouth       rosuvastatin (CRESTOR) 5 MG tablet TAKE 1 TABLET BY MOUTH EVERYDAY AT BEDTIME 90 tablet 2     UNABLE TO FIND daily MEDICATION NAME: tumeric       VITAMIN E COMPLEX PO Take by mouth daily         Allergies  Allergies   Allergen Reactions     Lisinopril Cough     Currently taking-low dose         Family History  family history includes Cerebrovascular Disease in her mother; Lung Cancer in her father.    Social History  Social History     Tobacco Use     Smoking status: Never Smoker     Smokeless tobacco: Never Used   Substance Use Topics     Alcohol use: Yes     Alcohol/week: 3.3 standard drinks     Comment: Alcoholic Drinks/day: Occ     Drug use: No       Physical Exam  BP (!) 161/89 (BP Location: Right arm, Patient Position: Sitting, Cuff Size: Adult Regular)   Pulse 68   Ht 1.575 m (5' 2\")   Wt 60.3 kg (133 lb)   BMI 24.33 kg/m    Body mass index is 24.33 kg/m .    Physical Exam    GENERAL :  In no apparent distress  SKIN: Normal color, normal temperature     EYES: EOMI, No scleral icterus  NECK: No visible masses  RESP: No respiratory distress, normal effort  CARDIO: regular rate  ABDOMEN: flat, nondistended       NEURO: awake, alert, responds appropriately to questions   EXTREMITIES: No clubbing, cyanosis or edema.    I spent 45 minutes on today's visit in chart review, review of " imaging, review of labs, exam, coordinating care, and in counseling with patient.

## 2022-09-16 NOTE — PATIENT INSTRUCTIONS
Please reach out to the following centers to schedule your appointment:           [facility] [phone]   [place] [phone]   [place] [phone]   [place] [phone]   [place] [phone]   [place] [phone]   [place] [phone]     For any questions, please reach out to the [facility] Number for assistance: [phone].

## 2022-09-19 ENCOUNTER — TELEPHONE (OUTPATIENT)
Dept: ENDOCRINOLOGY | Facility: CLINIC | Age: 77
End: 2022-09-19

## 2022-09-19 NOTE — TELEPHONE ENCOUNTER
Please place Prolia for injections at [facility] needs to be CAM orders.

## 2022-09-20 ENCOUNTER — TELEPHONE (OUTPATIENT)
Dept: ENDOCRINOLOGY | Facility: CLINIC | Age: 77
End: 2022-09-20

## 2022-09-20 NOTE — TELEPHONE ENCOUNTER
Per recent patient survey response. She has questions on next steps for her follow up and Prolia injection. Please contact patient.    Bridgette Wharton RN, Clinic Manager

## 2022-09-21 NOTE — TELEPHONE ENCOUNTER
"Philip requesting CAM order instead of placed therapy plan order.    Once placed we can call patient to inquire re:    \"Per recent patient survey response. She has questions on next steps for her follow up and Prolia injection. Please contact patient.     Bridgette Wharton RN, Clinic Manager\"  ____________________________________________     Aliyah VILLARREAL   Specialty Clinic RN  "

## 2022-09-25 ENCOUNTER — HEALTH MAINTENANCE LETTER (OUTPATIENT)
Age: 77
End: 2022-09-25

## 2022-09-28 DIAGNOSIS — M81.8 OTHER OSTEOPOROSIS WITHOUT CURRENT PATHOLOGICAL FRACTURE: ICD-10-CM

## 2022-09-28 DIAGNOSIS — K20.0 EOSINOPHILIC ESOPHAGITIS: Primary | ICD-10-CM

## 2022-10-07 NOTE — TELEPHONE ENCOUNTER
Pt is coming in on 10/18/22 for Prolia.     All orders for prolia were canceled. Please advise if patient is not to receive Prolia, or if this was in Error.     If Error please replace orders.

## 2022-10-08 ENCOUNTER — HOSPITAL ENCOUNTER (EMERGENCY)
Facility: HOSPITAL | Age: 77
Discharge: HOME | End: 2022-10-08
Attending: EMERGENCY MEDICINE | Admitting: EMERGENCY MEDICINE
Payer: MEDICARE

## 2022-10-08 VITALS
HEART RATE: 72 BPM | RESPIRATION RATE: 20 BRPM | TEMPERATURE: 98.7 F | WEIGHT: 136.6 LBS | DIASTOLIC BLOOD PRESSURE: 84 MMHG | OXYGEN SATURATION: 99 % | BODY MASS INDEX: 24.2 KG/M2 | SYSTOLIC BLOOD PRESSURE: 151 MMHG | HEIGHT: 63 IN

## 2022-10-08 DIAGNOSIS — M62.830 BACK MUSCLE SPASM: ICD-10-CM

## 2022-10-08 PROCEDURE — 99283 EMERGENCY DEPT VISIT LOW MDM: CPT

## 2022-10-08 RX ORDER — CYCLOBENZAPRINE HCL 10 MG
10 TABLET ORAL 3 TIMES DAILY PRN
Qty: 18 TABLET | Refills: 0 | Status: SHIPPED | OUTPATIENT
Start: 2022-10-08 | End: 2022-10-14

## 2022-10-08 NOTE — ED PROVIDER NOTES
EMERGENCY DEPARTMENT ENCOUNTER            IMPRESSION:  Back spasm -lower thoracic lumbar back pain      MEDICAL DECISION MAKING:  Patient evaluated for left-sided lower thoracic and upper lumbar back pain.  Patient reports onset with activity.  She has had similar symptoms in the past that was treated with muscle relaxants.  She denies any associated abdominal or chest pain.  No radicular symptoms.  Her symptoms are worse with activity and range of motion    On exam her blood pressure is elevated.  She does not appear acutely ill.  He has a lower lumbar midline incision is well-healed.  Back is otherwise normal to inspection.  There is tenderness and visible spasm along the thoracic paraspinal muscles.  No distal neurologic symptoms.  No abdominal tenderness.    Patient is convinced the symptoms are consistent with her prior back spasm.  I discussed other etiologies such as kidney stone, intra-abdominal vascular, or infection.    Her symptoms seem most consistent with musculoskeletal.  She has had similar symptoms in the past.  Her onset was with activity.  There is some tenderness to palpation over the skin.  Symptoms are not suggestive of a kidney stone or intra-abdominal vascular pathology    She will be discharged home with symptomatic medication      =================================================================  CHIEF COMPLAINT:  Chief Complaint   Patient presents with     Spasms         HPI  Ivis Cook is a 77 year old female with a history of hypertension, hyperlipidemia, DVT, PE, spondylosis, and osteopenia who presents to the ED by walk in for evaluation of back spasms.    The patient reports intermittent back spasms on her left side that started yesterday. Notes that it radiates to her neck, but not to her buttocks or legs. Denies any recent fall or injury. She was seen for similar symptoms last May and was prescribed cyclobenzaprine, which she states provided relief. The patient notes that she  recently took a river trip along the [place] 7 days ago where she was lifting a lot of heavy things. She initially believed this was causing her symptoms. The pain is not worse with movement. She states that the pain is slightly relieved with pressure. Denies nausea, vomiting, abdominal pain, urinary symptoms, or any other complaints at this time.      I, Donna Brinkler am serving as a scribe to document services personally performed by Dr. Connor Rg MD, based on my observation and the provider's statements to me. I, Dr. Connor Rg MD attest that Donna Rose is acting in a scribe capacity, has observed my performance of the services and has documented them in accordance with my direction.      REVIEW OF SYSTEMS   Constitutional: Denies fever, chills, unintentional weight loss or fatigue   Eyes: Denies visual changes or discharge    HENT: Denies sore throat, ear pain or neck pain  Respiratory: Denies cough or shortness of breath    Cardiovascular: Denies chest pain, palpitations or leg swelling  GI: Denies abdominal pain, nausea, vomiting, or dark, bloody stools.    : Denies hematuria, dysuria, or flank pain  Musculoskeletal: Denies any new muscle/joint pains. Positive for back pain.  Skin: Denies rash or wound  Neurologic: Denies current headache, new weakness, focal weakness, or sensory changes    Lymphatic: Denies swollen glands    Psychiatric: Denies depression, suicidal ideation or homicidal ideation.    Remainder of systems reviewed, unless noted in HPI all others negative.      PAST MEDICAL HISTORY:  Past Medical History:   Diagnosis Date     Basal cell carcinoma of hand 01/01/2013     Basal cell carcinoma of leg 01/01/2012     DVT (deep venous thrombosis) (H) 03/29/2018    Not seen but assumed since PE     Eosinophilic esophagitis      Generalized osteoarthritis      HPV in female      Osteopenia      Pulmonary emboli (H) 03/01/2018     Synovial cyst popliteal space     Left        PAST SURGICAL  HISTORY:  Past Surgical History:   Procedure Laterality Date      CORRECT BUNION,OROZCO/DELACRUZ/JEROME      Description: Bunion Correction By Cheilectomy;  Recorded: 08/24/2009;  Comments: Padmini-1985      REMOVAL OF TONSILS,<11 Y/O      Description: Tonsillectomy;  Recorded: 08/24/2009;  Comments: Age 3     HYSTERECTOMY  01/01/1993    Total and ceervix removed-Benign, endometriosis     OOPHORECTOMY Bilateral 01/01/1993     OTHER SURGICAL HISTORY Left 01/01/2017    left knee arthroplasty     MA ARTHRODESIS ANT INTERBODY MIN DISCECTOMY,LUMBAR      Description: Lumbar Vertebral Fusion;  Recorded: 08/24/2009;  Comments: L5-S1 with laminectomy x 2-1976 and 1977     MA EXCISE HAND/FOOT NEUROMA      Description: Excision Of Neuroma Of Foot;  Recorded: 08/24/2009;  Comments: Padmini-1987     [facility] APPENDECTOMY      Description: Appendectomy;  Recorded: 08/24/2009;  Comments: 1967     [facility] TOTAL KNEE ARTHROPLASTY Right 06/05/2017    Procedure: RIGHT TOTAL KNEE ARTHROPLASTY;  Surgeon: Donaldo Ron DO;  Location: [facility];  Service: Orthopedics         CURRENT MEDICATIONS:    cyclobenzaprine (FLEXERIL) 10 MG tablet  BIOTIN PO  Cholecalciferol (VITAMIN D3 PO)  Collagen Hydrolysate, Bovine, POWD  esomeprazole (NEXIUM) 20 MG DR capsule  lisinopril (ZESTRIL) 5 MG tablet  losartan (COZAAR) 25 MG tablet  MAGNESIUM GLUCONATE PO  Omega-3 Fatty Acids (FISH OIL PO)  Oyster Shell (OYSTER CALCIUM PO)  potassium chloride ER (MICRO-K) 10 MEQ CR capsule  rosuvastatin (CRESTOR) 5 MG tablet  UNABLE TO FIND  VITAMIN E COMPLEX PO        ALLERGIES:  No Known Allergies    FAMILY HISTORY:  Family History   Problem Relation Age of Onset     Lung Cancer Father      Cerebrovascular Disease Mother        SOCIAL HISTORY:   Social History     Socioeconomic History     Marital status:    Tobacco Use     Smoking status: Never Smoker     Smokeless tobacco: Never Used   Substance and Sexual Activity     Alcohol use: Yes      "Alcohol/week: 3.3 standard drinks     Comment: Alcoholic Drinks/day: Occ     Drug use: No     Sexual activity: Never       PHYSICAL EXAM:    BP (!) 151/84   Pulse 72   Temp 98.7  F (37.1  C) (Temporal)   Resp 20   Ht 1.6 m (5' 3\")   Wt 62 kg (136 lb 9.6 oz)   SpO2 99%   BMI 24.20 kg/m      Constitutional: Awake, alert, in no acute distress  Head: Normocephalic, atraumatic.  ENT: Mucous membranes moist. Posterior oropharynx appears normal.  Eyes: Pupils midrange and reactive ,no conjunctival discharge  Neck: No lymphadenopathy, no stridor, supple, no soft tissue swelling  Chest: No tenderness   Respiratory: Respirations even, unlabored. Lungs clear to ascultation bilaterally, in no acute respiratory distress.  Cardiovascular: Regular rate and rhythm.+2 radial pulses, equal bilaterally.  No murmurs.  Distal pulses  GI: Abdomen soft, non-tender to palpation in all 4 quadrants. No guarding or rebound. Bowel sounds intact on all 4 quadrants.   Back: No CVA tenderness.    There is some tenderness and spasm to palpation over the lower left thoracic and lumbar paraspinal region  Musculoskeletal: Moves all 4 extremities equally, strength symmetrical on bilateral uppers and lowers.  No peripheral edema  Integument: Warm, dry. No rash. No bruising or petechiae.  Lymphatic: No cervical lymphadenopathy  Neurologic: Alert & oriented x 3. Normal speech. Grossly normal motor and sensory function. No focal deficits noted.  NIHSS = 0  Psychiatric: Normal mood and affect. Normal judgement.    ED COURSE:  1:07 PM Met with and introduced myself to the patient. Disccused history and plan of care.          NEW PRESCRIPTIONS STARTED AT TODAY'S ER VISIT:  Discharge Medication List as of 10/8/2022  1:21 PM      START taking these medications    Details   cyclobenzaprine (FLEXERIL) 10 MG tablet Take 1 tablet (10 mg) by mouth 3 times daily as needed for muscle spasms, Disp-18 tablet, R-0, Local Print                FINAL DIAGNOSIS:    " ICD-10-CM    1. Back muscle spasm  M62.830             At the conclusion of the encounter I discussed the results of all of the tests and the disposition. The questions were answered. The patient or family acknowledged understanding and was agreeable with the care plan.     NAME: Ivis Cook  AGE: 77 year old female  YOB: 1945  MRN: [id number]  EVALUATION DATE & TIME: No admission date for patient encounter.    PCP: Addie Martinez    ED PROVIDER: Connor Rg M.D.      Donna GOMEZ, am serving as a scribe to document services personally performed by Dr. Connor Rg based on my observation and the provider's statements to me. I, Connor Rg MD attest that Donna Rose is acting in a scribe capacity, has observed my performance of the services and has documented them in accordance with my direction.    Connor Rg M.D.  Emergency Medicine  [facility] EMERGENCY DEPARTMENT  [facility] [facility] [address]  [phone]  Dept: [phone]  10/8/2022     Connor Rg MD  10/08/22 0381

## 2022-10-08 NOTE — DISCHARGE INSTRUCTIONS
You are being treated for a muscle spasm  Take the 1000 mg Tylenol and 400 mg Motrin 3 times a day  Flexeril for muscle spasm; you may apply heat as well  If symptoms persist for more than 3 days or worsen you should be reevaluated  Your blood pressure is elevated and should be rechecked

## 2022-10-08 NOTE — ED TRIAGE NOTES
Patient is here with a back spasm that started yesterday. She was prescribed flexeril, but is out and does not have any prescription refills.      Triage Assessment     Row Name 10/08/22 1141       Triage Assessment (Adult)    Airway WDL WDL

## 2022-10-14 ENCOUNTER — TELEPHONE (OUTPATIENT)
Dept: ENDOCRINOLOGY | Facility: CLINIC | Age: 77
End: 2022-10-14

## 2022-10-14 NOTE — TELEPHONE ENCOUNTER
----- Message from Breann Lua RN sent at 10/14/2022 11:23 AM CDT -----  Please reschedule the pt's prolia 1-2 weeks out, as we do not have an insurance approval.

## 2022-10-26 DIAGNOSIS — M81.8 OTHER OSTEOPOROSIS WITHOUT CURRENT PATHOLOGICAL FRACTURE: Primary | ICD-10-CM

## 2022-10-26 DIAGNOSIS — Z92.29 PERSONAL HISTORY OF DRUG THERAPY: ICD-10-CM

## 2022-10-31 ENCOUNTER — LAB (OUTPATIENT)
Dept: LAB | Facility: CLINIC | Age: 77
End: 2022-10-31
Payer: MEDICARE

## 2022-10-31 DIAGNOSIS — M81.8 OTHER OSTEOPOROSIS WITHOUT CURRENT PATHOLOGICAL FRACTURE: ICD-10-CM

## 2022-10-31 LAB
ALBUMIN SERPL BCG-MCNC: 4.4 G/DL (ref 3.5–5.2)
CALCIUM SERPL-MCNC: 9.6 MG/DL (ref 8.8–10.2)

## 2022-10-31 PROCEDURE — 82040 ASSAY OF SERUM ALBUMIN: CPT

## 2022-10-31 PROCEDURE — 36415 COLL VENOUS BLD VENIPUNCTURE: CPT

## 2022-10-31 PROCEDURE — 82310 ASSAY OF CALCIUM: CPT

## 2022-11-01 ENCOUNTER — ALLIED HEALTH/NURSE VISIT (OUTPATIENT)
Dept: ENDOCRINOLOGY | Facility: CLINIC | Age: 77
End: 2022-11-01
Payer: MEDICARE

## 2022-11-01 DIAGNOSIS — K20.0 EOSINOPHILIC ESOPHAGITIS: ICD-10-CM

## 2022-11-01 DIAGNOSIS — M81.8 OTHER OSTEOPOROSIS WITHOUT CURRENT PATHOLOGICAL FRACTURE: ICD-10-CM

## 2022-11-01 PROCEDURE — 99207 PR NO CHARGE NURSE ONLY: CPT | Performed by: INTERNAL MEDICINE

## 2022-11-01 PROCEDURE — 96372 THER/PROPH/DIAG INJ SC/IM: CPT | Performed by: INTERNAL MEDICINE

## 2022-11-30 ENCOUNTER — TELEPHONE (OUTPATIENT)
Dept: ENDOCRINOLOGY | Facility: CLINIC | Age: 77
End: 2022-11-30

## 2022-11-30 NOTE — TELEPHONE ENCOUNTER
[facility] Call Center    Phone Message    May a detailed message be left on voicemail: yes     Reason for Call: Other: .      Per Patient is wanting to get a call back. Patient states she had a Prolia Injection and states she remembers hearing something about dental restriction. Patient states she is needing to get a crown done on her tooth and wanting to speak with the clinic about it. Please advise.     Action Taken: Message routed to:  [facility] Endo    Travel Screening: Not Applicable

## 2022-11-30 NOTE — TELEPHONE ENCOUNTER
Mychart info and left msg .  Aliyah VILLARREAL   [facility] RN   Include Pregnancy/Lactation Warning?: No

## 2023-02-12 DIAGNOSIS — E78.00 PURE HYPERCHOLESTEROLEMIA, UNSPECIFIED: ICD-10-CM

## 2023-02-13 RX ORDER — ROSUVASTATIN CALCIUM 10 MG/1
10 TABLET, COATED ORAL DAILY
Qty: 90 TABLET | Refills: 3 | Status: SHIPPED | OUTPATIENT
Start: 2023-02-13 | End: 2024-04-08

## 2023-02-13 NOTE — TELEPHONE ENCOUNTER
"Routing refill request to provider for review/approval because:  Pharmacy requesting a alternative  Not on active med list    Last Written Prescription Date:  NA  Last Fill Quantity: NA,  # refills: NA   Last office visit provider:  4/27/2022     Requested Prescriptions   Pending Prescriptions Disp Refills     atorvastatin (LIPITOR) 10 MG tablet [Pharmacy Med Name: ATORVASTATIN 10 MG TABLET] 90 tablet 0       Statins Protocol Failed - 2/12/2023 12:07 PM        Failed - Medication is active on med list        Passed - LDL on file in past 12 months     Recent Labs   Lab Test 04/25/22  0922   LDL 96             Passed - No abnormal creatine kinase in past 12 months     No lab results found.             Passed - Recent (12 mo) or future (30 days) visit within the authorizing provider's specialty     Patient has had an office visit with the authorizing provider or a provider within the authorizing providers department within the previous 12 mos or has a future within next 30 days. See \"Patient Info\" tab in inbasket, or \"Choose Columns\" in Meds & Orders section of the refill encounter.              Passed - Patient is age 18 or older        Passed - No active pregnancy on record        Passed - No positive pregnancy test in past 12 months             Aliyah Marie RN 02/13/23 4:06 PM  "

## 2023-02-13 NOTE — TELEPHONE ENCOUNTER
I will refill her medication.  If she is due for an annual wellness please help her set that.  Thank you.

## 2023-03-10 ENCOUNTER — OFFICE VISIT (OUTPATIENT)
Dept: FAMILY MEDICINE | Facility: CLINIC | Age: 78
End: 2023-03-10
Payer: MEDICARE

## 2023-03-10 VITALS
OXYGEN SATURATION: 92 % | SYSTOLIC BLOOD PRESSURE: 130 MMHG | WEIGHT: 142 LBS | DIASTOLIC BLOOD PRESSURE: 62 MMHG | RESPIRATION RATE: 20 BRPM | HEIGHT: 63 IN | HEART RATE: 60 BPM | BODY MASS INDEX: 25.16 KG/M2

## 2023-03-10 DIAGNOSIS — B02.9 HERPES ZOSTER WITHOUT COMPLICATION: Primary | ICD-10-CM

## 2023-03-10 PROCEDURE — 99213 OFFICE O/P EST LOW 20 MIN: CPT | Performed by: FAMILY MEDICINE

## 2023-03-10 RX ORDER — VALACYCLOVIR HYDROCHLORIDE 1 G/1
1000 TABLET, FILM COATED ORAL 3 TIMES DAILY
Qty: 21 TABLET | Refills: 0 | Status: SHIPPED | OUTPATIENT
Start: 2023-03-10 | End: 2023-04-12

## 2023-03-10 NOTE — PROGRESS NOTES
"  Assessment & Plan     Herpes zoster without complication  -Early and acute onset  Start:  - valACYclovir (VALTREX) 1000 mg tablet; Take 1 tablet (1,000 mg) by mouth 3 times daily for 7 days  May take gabapentin on hand as prescribed to mitigate the pain symptoms                       No follow-ups on file.    Eric Michele MD  [facility] OCTAVIO Langston is a 77 year old presenting for having an outbreak of shingles on her lower back 1-2 days, currently dealing with lot of stress ( losing her condo to the [place] on [address] [facility]).  She has some left over gabapentin on hand previously prescribed for another case of shingles a few months ago.       History of Present Illness       Reason for visit:  Shingles  Symptom onset:  1-3 days ago    She eats 2-3 servings of fruits and vegetables daily.She consumes 0 sweetened beverage(s) daily.She exercises with enough effort to increase her heart rate 10 to 19 minutes per day.  She exercises with enough effort to increase her heart rate 5 days per week.   She is taking medications regularly.           Review of Systems         Objective    /62 (BP Location: Right arm, Patient Position: Sitting, Cuff Size: Adult Regular)   Pulse 60   Resp 20   Ht 1.6 m (5' 3\")   Wt 64.4 kg (142 lb)   SpO2 92%   BMI 25.15 kg/m    Body mass index is 25.15 kg/m .  Physical Exam   GENERAL: alert and no distress  SKIN: erythematous skin of the left lower back, no blistering or papules.                     "

## 2023-04-11 DIAGNOSIS — I10 ESSENTIAL (PRIMARY) HYPERTENSION: ICD-10-CM

## 2023-04-12 ENCOUNTER — OFFICE VISIT (OUTPATIENT)
Dept: FAMILY MEDICINE | Facility: CLINIC | Age: 78
End: 2023-04-12
Payer: MEDICARE

## 2023-04-12 VITALS
SYSTOLIC BLOOD PRESSURE: 138 MMHG | TEMPERATURE: 98.2 F | RESPIRATION RATE: 16 BRPM | DIASTOLIC BLOOD PRESSURE: 77 MMHG | HEART RATE: 65 BPM | HEIGHT: 61 IN | WEIGHT: 137 LBS | OXYGEN SATURATION: 98 % | BODY MASS INDEX: 25.86 KG/M2

## 2023-04-12 DIAGNOSIS — K20.0 EOSINOPHILIC ESOPHAGITIS: ICD-10-CM

## 2023-04-12 DIAGNOSIS — B02.9 HERPES ZOSTER WITHOUT COMPLICATION: ICD-10-CM

## 2023-04-12 DIAGNOSIS — Z12.31 VISIT FOR SCREENING MAMMOGRAM: ICD-10-CM

## 2023-04-12 DIAGNOSIS — Z00.00 ENCOUNTER FOR MEDICARE ANNUAL WELLNESS EXAM: Primary | ICD-10-CM

## 2023-04-12 DIAGNOSIS — I26.99 OTHER PULMONARY EMBOLISM WITHOUT ACUTE COR PULMONALE, UNSPECIFIED CHRONICITY (H): ICD-10-CM

## 2023-04-12 DIAGNOSIS — M81.0 AGE-RELATED OSTEOPOROSIS WITHOUT CURRENT PATHOLOGICAL FRACTURE: ICD-10-CM

## 2023-04-12 DIAGNOSIS — Z85.828 HISTORY OF BASAL CELL CARCINOMA: ICD-10-CM

## 2023-04-12 DIAGNOSIS — I10 BENIGN ESSENTIAL HYPERTENSION: ICD-10-CM

## 2023-04-12 DIAGNOSIS — E55.9 VITAMIN D DEFICIENCY: ICD-10-CM

## 2023-04-12 DIAGNOSIS — R93.89 ABNORMAL FINDINGS ON DIAGNOSTIC IMAGING OF OTHER SPECIFIED BODY STRUCTURES: ICD-10-CM

## 2023-04-12 DIAGNOSIS — E78.00 PURE HYPERCHOLESTEROLEMIA: ICD-10-CM

## 2023-04-12 DIAGNOSIS — Z00.00 MEDICARE ANNUAL WELLNESS VISIT, SUBSEQUENT: ICD-10-CM

## 2023-04-12 LAB
ALBUMIN SERPL BCG-MCNC: 4.5 G/DL (ref 3.5–5.2)
ALP SERPL-CCNC: 31 U/L (ref 35–104)
ALT SERPL W P-5'-P-CCNC: 23 U/L (ref 10–35)
ANION GAP SERPL CALCULATED.3IONS-SCNC: 13 MMOL/L (ref 7–15)
AST SERPL W P-5'-P-CCNC: 30 U/L (ref 10–35)
BILIRUB SERPL-MCNC: 0.3 MG/DL
BUN SERPL-MCNC: 20.6 MG/DL (ref 8–23)
CALCIUM SERPL-MCNC: 9.4 MG/DL (ref 8.8–10.2)
CHLORIDE SERPL-SCNC: 106 MMOL/L (ref 98–107)
CHOLEST SERPL-MCNC: 142 MG/DL
CREAT SERPL-MCNC: 0.77 MG/DL (ref 0.51–0.95)
DEPRECATED CALCIDIOL+CALCIFEROL SERPL-MC: 38 UG/L (ref 20–75)
DEPRECATED HCO3 PLAS-SCNC: 20 MMOL/L (ref 22–29)
ERYTHROCYTE [DISTWIDTH] IN BLOOD BY AUTOMATED COUNT: 14.4 % (ref 10–15)
GFR SERPL CREATININE-BSD FRML MDRD: 78 ML/MIN/1.73M2
GLUCOSE SERPL-MCNC: 105 MG/DL (ref 70–99)
HCT VFR BLD AUTO: 40.9 % (ref 35–47)
HDLC SERPL-MCNC: 62 MG/DL
HGB BLD-MCNC: 13.8 G/DL (ref 11.7–15.7)
LDLC SERPL CALC-MCNC: 64 MG/DL
MCH RBC QN AUTO: 31.2 PG (ref 26.5–33)
MCHC RBC AUTO-ENTMCNC: 33.7 G/DL (ref 31.5–36.5)
MCV RBC AUTO: 93 FL (ref 78–100)
NONHDLC SERPL-MCNC: 80 MG/DL
PLATELET # BLD AUTO: 279 10E3/UL (ref 150–450)
POTASSIUM SERPL-SCNC: 4.2 MMOL/L (ref 3.4–5.3)
PROT SERPL-MCNC: 7 G/DL (ref 6.4–8.3)
RBC # BLD AUTO: 4.42 10E6/UL (ref 3.8–5.2)
SODIUM SERPL-SCNC: 139 MMOL/L (ref 136–145)
TRIGL SERPL-MCNC: 80 MG/DL
TSH SERPL DL<=0.005 MIU/L-ACNC: 0.83 UIU/ML (ref 0.3–4.2)
WBC # BLD AUTO: 6.8 10E3/UL (ref 4–11)

## 2023-04-12 PROCEDURE — 90632 HEPA VACCINE ADULT IM: CPT | Performed by: FAMILY MEDICINE

## 2023-04-12 PROCEDURE — 80061 LIPID PANEL: CPT | Performed by: FAMILY MEDICINE

## 2023-04-12 PROCEDURE — 82306 VITAMIN D 25 HYDROXY: CPT | Performed by: FAMILY MEDICINE

## 2023-04-12 PROCEDURE — G0439 PPPS, SUBSEQ VISIT: HCPCS | Performed by: FAMILY MEDICINE

## 2023-04-12 PROCEDURE — 90471 IMMUNIZATION ADMIN: CPT | Performed by: FAMILY MEDICINE

## 2023-04-12 PROCEDURE — 36415 COLL VENOUS BLD VENIPUNCTURE: CPT | Performed by: FAMILY MEDICINE

## 2023-04-12 PROCEDURE — 84443 ASSAY THYROID STIM HORMONE: CPT | Performed by: FAMILY MEDICINE

## 2023-04-12 PROCEDURE — 85027 COMPLETE CBC AUTOMATED: CPT | Performed by: FAMILY MEDICINE

## 2023-04-12 PROCEDURE — 80053 COMPREHEN METABOLIC PANEL: CPT | Performed by: FAMILY MEDICINE

## 2023-04-12 PROCEDURE — 99214 OFFICE O/P EST MOD 30 MIN: CPT | Mod: 25 | Performed by: FAMILY MEDICINE

## 2023-04-12 RX ORDER — VALACYCLOVIR HYDROCHLORIDE 1 G/1
1000 TABLET, FILM COATED ORAL 3 TIMES DAILY
Qty: 21 TABLET | Refills: 3 | Status: SHIPPED | OUTPATIENT
Start: 2023-04-12 | End: 2024-04-18

## 2023-04-12 RX ORDER — LISINOPRIL 5 MG/1
TABLET ORAL
Qty: 90 TABLET | Refills: 3 | Status: SHIPPED | OUTPATIENT
Start: 2023-04-12 | End: 2024-06-19

## 2023-04-12 ASSESSMENT — ENCOUNTER SYMPTOMS
CHILLS: 0
PARESTHESIAS: 0
NAUSEA: 0
DIARRHEA: 0
DIZZINESS: 0
ABDOMINAL PAIN: 0
FREQUENCY: 0
HEARTBURN: 0
FEVER: 0
SORE THROAT: 0
NERVOUS/ANXIOUS: 0
BREAST MASS: 0
HEADACHES: 0
COUGH: 0
ARTHRALGIAS: 1
DYSURIA: 0
CONSTIPATION: 0
WEAKNESS: 0
EYE PAIN: 0
MYALGIAS: 0
PALPITATIONS: 0
HEMATOCHEZIA: 0
HEMATURIA: 0
SHORTNESS OF BREATH: 0
JOINT SWELLING: 0

## 2023-04-12 ASSESSMENT — ACTIVITIES OF DAILY LIVING (ADL): CURRENT_FUNCTION: NO ASSISTANCE NEEDED

## 2023-04-12 NOTE — TELEPHONE ENCOUNTER
"Routing refill request to provider for review/approval because:  Early refill request    Last Written Prescription Date:  6/5/2022  Last Fill Quantity: 90,  # refills: 3   Last office visit provider:  3/10/2023     Requested Prescriptions   Pending Prescriptions Disp Refills     lisinopril (ZESTRIL) 5 MG tablet [Pharmacy Med Name: LISINOPRIL 5 MG TABLET] 90 tablet 3     Sig: TAKE 1 TABLET BY MOUTH EVERY DAY       ACE Inhibitors (Including Combos) Protocol Passed - 4/11/2023 10:35 AM        Passed - Blood pressure under 140/90 in past 12 months     BP Readings from Last 3 Encounters:   04/12/23 138/77   03/10/23 130/62   10/08/22 (!) 151/84                 Passed - Recent (12 mo) or future (30 days) visit within the authorizing provider's specialty     Patient has had an office visit with the authorizing provider or a provider within the authorizing providers department within the previous 12 mos or has a future within next 30 days. See \"Patient Info\" tab in inbasket, or \"Choose Columns\" in Meds & Orders section of the refill encounter.              Passed - Medication is active on med list        Passed - Patient is age 18 or older        Passed - No active pregnancy on record        Passed - Normal serum creatinine on file in past 12 months     Recent Labs   Lab Test 04/27/22  1720   CR 0.77       Ok to refill medication if creatinine is low          Passed - Normal serum potassium on file in past 12 months     Recent Labs   Lab Test 04/27/22  1720   POTASSIUM 4.5             Passed - No positive pregnancy test within past 12 months             VENKATESH VARMA RN 04/12/23 1:02 PM  "

## 2023-04-12 NOTE — PATIENT INSTRUCTIONS
Seeing endocrinology in September for osteoporosis.    On Prolia injections next 1 planned in May.    If you have a shingles outbreak you can use Valtrex 1000 mg 3 times a day for up to 7 days.    Please check your dose of lisinopril.  On our chart it says 5 mg daily.  If you are taking a different dose please let me know.    Check the dose of losartan that you are on.  On our chart it says 25 mg and taking 1/2 tab. if you are taking a different dose please let me know.    Please see dermatology routinely for full body skin checks with history of basal cell cancer.        Health Maintenance   Topic Date Due     URINE DRUG SCREEN  NA     ANNUAL REVIEW OF HM ORDERS  Today     INFLUENZA VACCINE (1) Due in the fall     MEDICARE ANNUAL WELLNESS VISIT  Today     FALL RISK ASSESSMENT  Today     DEXA  08/05/2024     LIPID  Today     ADVANCE CARE PLANNING  We would like a copy please     DTAP/TDAP/TD IMMUNIZATION (3 - Td or Tdap) 07/29/2031     HEPATITIS C SCREENING  Completed     PHQ-2 (once per calendar year)  Completed     Pneumococcal Vaccine: 65+ Years  Completed     ZOSTER IMMUNIZATION  Completed     COVID-19 Vaccine  Completed     IPV IMMUNIZATION  Aged Out     MENINGITIS IMMUNIZATION  Aged Out     COLORECTAL CANCER SCREENING  Discontinued     Pap:   No longer needed    Mammogram:  Due in Aug. 2023, ordered  Patient Education   Personalized Prevention Plan  You are due for the preventive services outlined below.  Your care team is available to assist you in scheduling these services.  If you have already completed any of these items, please share that information with your care team to update in your medical record.  Health Maintenance Due   Topic Date Due     URINE DRUG SCREEN  Never done     Annual Wellness Visit  04/27/2023       Exercise for a Healthier Heart  You may wonder how you can improve the health of your heart. If you re thinking about exercise, you re on the right track. You don t need to become an  athlete. But you do need a certain amount of brisk exercise to help strengthen your heart. If you have been diagnosed with a heart condition, your healthcare provider may advise exercise to help your condition. To help make exercise a habit, choose safe, fun activities.      Exercise with a friend. When activity is fun, you're more likely to stick with it.     Before you start  Check with your healthcare provider before starting an exercise program. This is especially important if you haven't been active for a while. It's also important if you have a long-term (chronic) health problem such as heart disease, diabetes, or obesity. Also check with your provider if you're at high risk for having these problems.   Why exercise?  Exercising regularly offers many healthy rewards. It can help you do all of these:     Improve your blood cholesterol level to help prevent further heart trouble.    Lower your blood pressure to help prevent a stroke or heart attack.    Control diabetes or reduce your risk of getting this disease.    Improve your heart and lung function.    Reach and stay at a healthy weight.    Make your muscles stronger so you can stay active.    Prevent falls and fractures by slowing the loss of bone mass (osteoporosis).    Manage stress better.    Improve your sense of self and your body image.  Exercise tips      Ease into your routine. Set small goals. Then build on them. Talk with your healthcare provider first before starting an exercise routine if you're not sure what your activity level should be.    Exercise on most days. Aim for a total of at least 150 minutes (2 hours and 30 minutes) or more of moderate-intensity aerobic activity each week. You could also do 75 minutes (1 hour and 15 minutes) or more of vigorous-intensity aerobic activity each week. Or try for a combination of both. Moderate activity means that you breathe heavier and your heart rate increases, but you can still talk. Think about  doing at least 30 minutes of moderate exercise, 5 times a week. It's OK to work up to the 30-minute period over time. Examples of moderate-intensity activity are brisk walking, gardening, and water aerobics.    Step up your daily activity level.  Along with your exercise program, try being more active the whole day. Walk instead of drive. Or park further away so that you take more steps each day. Do more household tasks or yard work. You may not be able to meet the advised amount of physical activity. But doing some moderate- or vigorous-intensity aerobic activity can help reduce your risk for heart disease. Your healthcare provider can help you figure out what is best for you.    Choose 1 or more activities you enjoy.  Walking is one of the easiest things you can do. You can also try swimming, riding a bike, dancing, or taking an exercise class.    Call 911  Call 911 right away if any of these occur:     Chest pain that doesn't go away quickly with rest    New burning, tightness, pressure, or heaviness in your chest, neck, shoulders, back, or arms    Abnormal or severe shortness of breath    A very fast or irregular heartbeat (palpitations)    Fainting  When to call your healthcare provider  Call your healthcare provider if you have any of these:     Dizziness or lightheadedness    Mild shortness of breath or chest pain    Increased or new joint or muscle pain    Heena last reviewed this educational content on 7/1/2022 2000-2022 [employer]. All rights reserved. This information is not intended as a substitute for professional medical care. Always follow your healthcare professional's instructions.
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
## 2023-04-20 ENCOUNTER — TRANSFERRED RECORDS (OUTPATIENT)
Dept: HEALTH INFORMATION MANAGEMENT | Facility: CLINIC | Age: 78
End: 2023-04-20
Payer: MEDICARE

## 2023-04-25 ENCOUNTER — TRANSFERRED RECORDS (OUTPATIENT)
Dept: HEALTH INFORMATION MANAGEMENT | Facility: CLINIC | Age: 78
End: 2023-04-25

## 2023-04-30 DIAGNOSIS — I10 BENIGN ESSENTIAL HYPERTENSION: ICD-10-CM

## 2023-04-30 RX ORDER — LOSARTAN POTASSIUM 25 MG/1
TABLET ORAL
Qty: 90 TABLET | Refills: 3 | Status: SHIPPED | OUTPATIENT
Start: 2023-04-30 | End: 2024-04-18

## 2023-04-30 NOTE — TELEPHONE ENCOUNTER
"Last Written Prescription Date:  4/27/2022  Last Fill Quantity: 90,  # refills: 3   Last office visit provider:  4/12/2023     Requested Prescriptions   Pending Prescriptions Disp Refills     losartan (COZAAR) 25 MG tablet [Pharmacy Med Name: LOSARTAN POTASSIUM 25 MG TAB] 90 tablet 2     Sig: TAKE 1 TABLET BY MOUTH EVERY DAY       Angiotensin-II Receptors Passed - 4/30/2023 10:22 AM        Passed - Last blood pressure under 140/90 in past 12 months     BP Readings from Last 3 Encounters:   04/12/23 138/77   03/10/23 130/62   10/08/22 (!) 151/84                 Passed - Recent (12 mo) or future (30 days) visit within the authorizing provider's specialty     Patient has had an office visit with the authorizing provider or a provider within the authorizing providers department within the previous 12 mos or has a future within next 30 days. See \"Patient Info\" tab in inbasket, or \"Choose Columns\" in Meds & Orders section of the refill encounter.              Passed - Medication is active on med list        Passed - Patient is age 18 or older        Passed - No active pregnancy on record        Passed - Normal serum creatinine on file in past 12 months     Recent Labs   Lab Test 04/12/23  1017   CR 0.77       Ok to refill medication if creatinine is low          Passed - Normal serum potassium on file in past 12 months     Recent Labs   Lab Test 04/12/23  1017   POTASSIUM 4.2                    Passed - No positive pregnancy test in past 12 months             Aliyah Marie RN 04/30/23 5:09 PM  "

## 2023-05-01 ENCOUNTER — TRANSFERRED RECORDS (OUTPATIENT)
Dept: HEALTH INFORMATION MANAGEMENT | Facility: CLINIC | Age: 78
End: 2023-05-01
Payer: MEDICARE

## 2023-05-02 ENCOUNTER — ALLIED HEALTH/NURSE VISIT (OUTPATIENT)
Dept: ENDOCRINOLOGY | Facility: CLINIC | Age: 78
End: 2023-05-02
Payer: MEDICARE

## 2023-05-02 DIAGNOSIS — M81.8 OTHER OSTEOPOROSIS WITHOUT CURRENT PATHOLOGICAL FRACTURE: Primary | ICD-10-CM

## 2023-05-02 DIAGNOSIS — Z92.29 PERSONAL HISTORY OF DRUG THERAPY: ICD-10-CM

## 2023-05-02 PROCEDURE — 96372 THER/PROPH/DIAG INJ SC/IM: CPT | Performed by: INTERNAL MEDICINE

## 2023-05-02 NOTE — PROGRESS NOTES
"Prolia Injection Phone Screen      Screening questions have been asked 2-3 days prior to administration visit for Prolia. If any questions are answered with \"Yes,\" this phone encounter were will routed to ordering provider for further evaluation.     1.  When was the last injection?  11/1/22    2.  Has insurance for this injection been verified?  Yes    3.  Did you experience any new onset achiness or rashes that lasted for over a month with your previous Prolia injection?   No    4.  Do you have a fever over 101?F or a new deep cough that is unusual for you today? No    5.  Have you started any new medications in the last 6 months that you were told could affect your immune system? These may have been prescribed by oncologist, transplant, rheumatology, or dermatology.   No    6.  In the last 6 months have you have gastric bypass or parathyroid surgery?   No    7.  Do you plan dental work requiring drilling into the bone such as implants/extractions or oral surgery in the next 2-3 months?   No    8. Do you have new insurance since the last injection?    Patient informed if symptoms discussed above present prior to their administration appointment, they are to notify clinic immediately.     aTisha SANTIAGO RN          The following steps were completed to comply with the REMS program for Prolia:  1. Ordering provider has previously reviewed information in the Medication Guide and Patient Counseling Chart, including the serious risks of Prolia  and the symptoms of each risk and have been advised to seek prompt medical attention if they have signs or symptoms of any of the serious risks.  2. Provided each patient a copy of the Medication Guide and Patient Brochure.  See MAR for administration details.   Indication: Prolia  (denosumab) is a prescription medicine used to treat osteoporosis in patients who:   Are at high risk for fracture, meaning patients who have had a fracture related to osteoporosis, or who have " multiple risk factors for fracture; Cannot use another osteoporosis medicine or other osteoporosis medicines did not work well.   The timeline for early/late injections would be 4 weeks early and any time after the 6 month ana lilia. If a patient receives their injection late, then the subsequent injection would be 6 months from the date that they actually received the injection    Have the screening questions been asked prior to this administration? Yes    The following medication was given:     MEDICATION: Denosumab (Prolia) 60 mg/ml SOLN  ROUTE: SQ  SITE: Arm - Left  DOSE: 60mg  LOT #: [id number]  :  [employer]  EXPIRATION DATE:  08/31/25

## 2023-05-08 ENCOUNTER — TRANSFERRED RECORDS (OUTPATIENT)
Dept: HEALTH INFORMATION MANAGEMENT | Facility: CLINIC | Age: 78
End: 2023-05-08
Payer: MEDICARE

## 2023-05-12 ENCOUNTER — TRANSFERRED RECORDS (OUTPATIENT)
Dept: HEALTH INFORMATION MANAGEMENT | Facility: CLINIC | Age: 78
End: 2023-05-12
Payer: MEDICARE

## 2023-08-02 ENCOUNTER — TRANSFERRED RECORDS (OUTPATIENT)
Dept: HEALTH INFORMATION MANAGEMENT | Facility: CLINIC | Age: 78
End: 2023-08-02
Payer: MEDICARE

## 2023-08-07 ENCOUNTER — ANCILLARY PROCEDURE (OUTPATIENT)
Dept: MAMMOGRAPHY | Facility: CLINIC | Age: 78
End: 2023-08-07
Attending: FAMILY MEDICINE
Payer: MEDICARE

## 2023-08-07 DIAGNOSIS — Z12.31 VISIT FOR SCREENING MAMMOGRAM: ICD-10-CM

## 2023-08-07 PROCEDURE — 77067 SCR MAMMO BI INCL CAD: CPT

## 2023-08-22 ENCOUNTER — TRANSFERRED RECORDS (OUTPATIENT)
Dept: HEALTH INFORMATION MANAGEMENT | Facility: CLINIC | Age: 78
End: 2023-08-22

## 2023-09-27 ENCOUNTER — OFFICE VISIT (OUTPATIENT)
Dept: ENDOCRINOLOGY | Facility: CLINIC | Age: 78
End: 2023-09-27
Payer: MEDICARE

## 2023-09-27 VITALS
RESPIRATION RATE: 14 BRPM | SYSTOLIC BLOOD PRESSURE: 137 MMHG | DIASTOLIC BLOOD PRESSURE: 80 MMHG | HEART RATE: 75 BPM | BODY MASS INDEX: 25.11 KG/M2 | WEIGHT: 133 LBS | HEIGHT: 61 IN | OXYGEN SATURATION: 98 %

## 2023-09-27 DIAGNOSIS — K20.0 EOSINOPHILIC ESOPHAGITIS: ICD-10-CM

## 2023-09-27 DIAGNOSIS — M81.0 AGE-RELATED OSTEOPOROSIS WITHOUT CURRENT PATHOLOGICAL FRACTURE: ICD-10-CM

## 2023-09-27 DIAGNOSIS — K90.49 GASTROINTESTINAL INTOLERANCE TO BISPHOSPHONATES: ICD-10-CM

## 2023-09-27 DIAGNOSIS — M81.8 OTHER OSTEOPOROSIS WITHOUT CURRENT PATHOLOGICAL FRACTURE: Primary | ICD-10-CM

## 2023-09-27 DIAGNOSIS — M94.9 DISORDER OF BONE AND CARTILAGE: ICD-10-CM

## 2023-09-27 DIAGNOSIS — Z79.83 GASTROINTESTINAL INTOLERANCE TO BISPHOSPHONATES: ICD-10-CM

## 2023-09-27 DIAGNOSIS — M89.9 DISORDER OF BONE AND CARTILAGE: ICD-10-CM

## 2023-09-27 PROCEDURE — 99213 OFFICE O/P EST LOW 20 MIN: CPT | Performed by: INTERNAL MEDICINE

## 2023-09-27 RX ORDER — EPINEPHRINE 1 MG/ML
0.3 INJECTION, SOLUTION, CONCENTRATE INTRAVENOUS EVERY 5 MIN PRN
Status: CANCELLED | OUTPATIENT
Start: 2024-05-07

## 2023-09-27 RX ORDER — ALBUTEROL SULFATE 90 UG/1
1-2 AEROSOL, METERED RESPIRATORY (INHALATION)
Status: CANCELLED
Start: 2024-05-07

## 2023-09-27 RX ORDER — METHYLPREDNISOLONE SODIUM SUCCINATE 125 MG/2ML
125 INJECTION, POWDER, LYOPHILIZED, FOR SOLUTION INTRAMUSCULAR; INTRAVENOUS
Status: CANCELLED
Start: 2024-05-07

## 2023-09-27 RX ORDER — MEPERIDINE HYDROCHLORIDE 25 MG/ML
25 INJECTION INTRAMUSCULAR; INTRAVENOUS; SUBCUTANEOUS EVERY 30 MIN PRN
Status: CANCELLED | OUTPATIENT
Start: 2024-05-07

## 2023-09-27 RX ORDER — ALBUTEROL SULFATE 0.83 MG/ML
2.5 SOLUTION RESPIRATORY (INHALATION)
Status: CANCELLED | OUTPATIENT
Start: 2024-05-07

## 2023-09-27 RX ORDER — DIPHENHYDRAMINE HYDROCHLORIDE 50 MG/ML
50 INJECTION INTRAMUSCULAR; INTRAVENOUS
Status: CANCELLED
Start: 2024-05-07

## 2023-09-27 ASSESSMENT — PAIN SCALES - GENERAL: PAINLEVEL: EXTREME PAIN (8)

## 2023-09-27 NOTE — PROGRESS NOTES
Endocrine Consult note    Attending Assessment/Plan :     Other osteoporosis without current pathological fracture  Osteoporosis by FRAX score w/elevated hip fx risk     Assessment:  -Treatment failure of alendronate given worsening BMD (and hip fx risk) while on treatment.  Unclear if this was drug ineffectiveness or malabsorption given her hx of GI issues  -now on Prolia and tolerating well     Plan:  -continue prolia q6 months  -she will be going out of state from end of sept thru spring most likely, starting in sept 2024.  Will plan to get her set up with followup before that to figure out prolia dosing schedule  -repeat dexa to assess effectiveness of prolia   -continue current vitamin D supplement    I have independently reviewed and interpreted labs, imaging as indicated.      RTC 1 year     Chief complaint:  Ivis is a 77 year old female seen in consultation at the request of  for osteoporosis.     HISTORY OF PRESENT ILLNESS    Ivis is a 77-year-old female with past medical history significant for eosinophilic esophagitis, and osteoporosis.    Initial HPI:  She was started on Fosamax 2 years ago and osteoporosis diagnosed by DEXA.  Follow-up DEXA done recently showed worsening of T score in the left femoral neck from -1.7 to -2.  Importantly it also showed a worsening of major osteoporotic fracture risk from 12.7% to 15.3%, and hip fracture risk from 3.2% to 4.5%.  This was while taking alendronate on a weekly basis.    She has not had any fractures or falls since starting therapy.  No intolerance to alendronate otherwise.    She currently takes vitamin D3 2000 units once daily.  She also takes calcium supplement once daily.  Does not frequently consume dairy.    Of note she will be going out of state at the end of October and will be returning in April.  We discussed this in the setting of potential Prolia use.    Endocrine relevant labs are as follows:  Normal kidney function  Normal vitamin  D level    Relevant imaging is as follows: (as read by me as it pertains to endocrine relevant organs)     RIGHT Hip Total: BMD: 0.883 g/cm2. T-score: -1.0. Z-score: 0.9  RIGHT Hip Femoral neck: BMD: 0.905 g/cm2. T-score: -1.0. Z-score: 1.1  LEFT Hip Total: BMD: 0.841 g/cm2. T-score: -1.3. Z-score: 0.6  LEFT Hip Femoral neck: BMD: 0.759 g/cm2. T-score: -2.0. Z-score: 0.1  LEFT Radius 33%: BMD: 0.769 g/cm2. T-score: -1.2. Z-score: 1.4     WHO Criteria:  Normal: T score at or above -1 SD  Osteopenia: T score between -1 and -2.5 SD  Osteoporosis: T score at or below -2.5 SD     FRAX Results: 10 year probability of major osteoporotic fracture is 15.3%, and of hip fracture is 4.5%, based on left femoral neck BMD.    Prior Major risk 2 years prior was 12.7% and hip fx risk 3.2%    Today: comes for followup after starting reclast.  No falls or fractures since last visit.  Tolerating prolia well.  Due for next dose in December.  States she will be leaving the state from sept through spring but starting next year.     REVIEW OF SYSTEMS    10 system ROS otherwise as per the HPI or negative    Past Medical History  Past Medical History:   Diagnosis Date    Basal cell carcinoma of hand 01/01/2013    Basal cell carcinoma of leg 01/01/2012    DVT (deep venous thrombosis) (H) 03/29/2018    Not seen but assumed since PE    Eosinophilic esophagitis     Generalized osteoarthritis     HPV in female     Osteopenia     Pulmonary emboli (H) 03/01/2018    Synovial cyst popliteal space     Left        Medications  Current Outpatient Medications   Medication Sig Dispense Refill    BIOTIN PO biotin      Cholecalciferol (VITAMIN D3 PO) Take by mouth daily      Collagen Hydrolysate, Bovine, POWD collagen (bovine)      esomeprazole (NEXIUM) 20 MG DR capsule Take 20 mg by mouth every morning (before breakfast) Take 30-60 minutes before eating.      lisinopril (ZESTRIL) 5 MG tablet TAKE 1 TABLET BY MOUTH EVERY DAY 90 tablet 3    MAGNESIUM  "GLUCONATE PO Take 200 mg by mouth      Omega-3 Fatty Acids (FISH OIL PO) Fish Oil      Oyster Shell (OYSTER CALCIUM PO) Take 1,000 mg by mouth daily      potassium chloride ER (MICRO-K) 10 MEQ CR capsule Take 1 tablet by mouth      rosuvastatin (CRESTOR) 10 MG tablet Take 1 tablet (10 mg) by mouth daily 90 tablet 3    UNABLE TO FIND daily MEDICATION NAME: tumeric      losartan (COZAAR) 25 MG tablet TAKE 1 TABLET BY MOUTH EVERY DAY (Patient not taking: Reported on 9/27/2023) 90 tablet 3    valACYclovir (VALTREX) 1000 mg tablet Take 1 tablet (1,000 mg) by mouth 3 times daily (Patient not taking: Reported on 9/27/2023) 21 tablet 3    VITAMIN E COMPLEX PO Take by mouth daily (Patient not taking: Reported on 9/27/2023)         Allergies  No Known Allergies        Family History  family history includes Cerebrovascular Disease in her mother; Lung Cancer in her father.    Social History  Social History     Tobacco Use    Smoking status: Never    Smokeless tobacco: Never   Substance Use Topics    Alcohol use: Yes     Alcohol/week: 3.3 standard drinks of alcohol     Comment: Alcoholic Drinks/day: Occ    Drug use: No       Physical Exam  /80 (BP Location: Left arm, Patient Position: Sitting, Cuff Size: Adult Regular)   Pulse 75   Resp 14   Ht 1.556 m (5' 1.25\")   Wt 60.3 kg (133 lb)   SpO2 98%   BMI 24.93 kg/m    Body mass index is 24.93 kg/m .    Physical Exam    GENERAL :  In no apparent distress  SKIN: Normal color, normal temperature     EYES: EOMI, No scleral icterus  NECK: No visible masses  RESP: No respiratory distress, normal effort  CARDIO: regular rate  ABDOMEN: flat, nondistended       NEURO: awake, alert, responds appropriately to questions   EXTREMITIES: No clubbing, cyanosis or edema.    I spent  27 minutes on today's visit established patient visit on chart review, review of imaging, review of labs, exam, coordinating care, and in counseling with patient.    "

## 2023-09-27 NOTE — PATIENT INSTRUCTIONS
Schedule DEXA if insurance will cover.     Schedule followup in 1 year (schedulers will contact you for this).     Continue prolia every 6 months.     none

## 2023-09-27 NOTE — NURSING NOTE
"Chief Complaint   Patient presents with    Follow Up     Annual follow up       Vitals:    09/27/23 1121   BP: 137/80   BP Location: Left arm   Patient Position: Sitting   Cuff Size: Adult Regular   Pulse: 75   Resp: 14   SpO2: 98%   Weight: 60.3 kg (133 lb)   Height: 1.556 m (5' 1.25\")     Wt Readings from Last 1 Encounters:   09/27/23 60.3 kg (133 lb)       Noemi Francis................9/27/2023    "

## 2023-09-27 NOTE — NURSING NOTE
"Chief Complaint   Patient presents with    Follow Up     Annual follow up       Vitals:    09/27/23 1121   BP: 137/80   BP Location: Left arm   Patient Position: Sitting   Cuff Size: Adult Regular   Pulse: 75   Resp: 14   SpO2: 98%   Weight: 60.3 kg (133 lb)   Height: 1.556 m (5' 1.25\")     Wt Readings from Last 1 Encounters:   09/27/23 60.3 kg (133 lb)       oNemi Francis................9/27/2023    "

## 2023-09-27 NOTE — LETTER
9/27/2023         RE: Ivis Cook  [address]  [facility] [address]        Dear Colleague,    Thank you for referring your patient, Ivis Cook, to the [facility] ENDOCRINOLOGY. Please see a copy of my visit note below.    Endocrine Consult note    Attending Assessment/Plan :     Other osteoporosis without current pathological fracture  Osteoporosis by FRAX score w/elevated hip fx risk     Assessment:  -Treatment failure of alendronate given worsening BMD (and hip fx risk) while on treatment.  Unclear if this was drug ineffectiveness or malabsorption given her hx of GI issues  -now on Prolia and tolerating well     Plan:  -continue prolia q6 months  -she will be going out of state from end of sept thru spring most likely, starting in sept 2024.  Will plan to get her set up with followup before that to figure out prolia dosing schedule  -repeat dexa to assess effectiveness of prolia   -continue current vitamin D supplement    I have independently reviewed and interpreted labs, imaging as indicated.      RTC 1 year     Chief complaint:  Ivis is a 77 year old female seen in consultation at the request of  for osteoporosis.     HISTORY OF PRESENT ILLNESS    Ivis is a 77-year-old female with past medical history significant for eosinophilic esophagitis, and osteoporosis.    Initial HPI:  She was started on Fosamax 2 years ago and osteoporosis diagnosed by DEXA.  Follow-up DEXA done recently showed worsening of T score in the left femoral neck from -1.7 to -2.  Importantly it also showed a worsening of major osteoporotic fracture risk from 12.7% to 15.3%, and hip fracture risk from 3.2% to 4.5%.  This was while taking alendronate on a weekly basis.    She has not had any fractures or falls since starting therapy.  No intolerance to alendronate otherwise.    She currently takes vitamin D3 2000 units once daily.  She also takes calcium supplement once daily.  Does  not frequently consume dairy.    Of note she will be going out of state at the end of October and will be returning in April.  We discussed this in the setting of potential Prolia use.    Endocrine relevant labs are as follows:  Normal kidney function  Normal vitamin D level    Relevant imaging is as follows: (as read by me as it pertains to endocrine relevant organs)     RIGHT Hip Total: BMD: 0.883 g/cm2. T-score: -1.0. Z-score: 0.9  RIGHT Hip Femoral neck: BMD: 0.905 g/cm2. T-score: -1.0. Z-score: 1.1  LEFT Hip Total: BMD: 0.841 g/cm2. T-score: -1.3. Z-score: 0.6  LEFT Hip Femoral neck: BMD: 0.759 g/cm2. T-score: -2.0. Z-score: 0.1  LEFT Radius 33%: BMD: 0.769 g/cm2. T-score: -1.2. Z-score: 1.4     WHO Criteria:  Normal: T score at or above -1 SD  Osteopenia: T score between -1 and -2.5 SD  Osteoporosis: T score at or below -2.5 SD     FRAX Results: 10 year probability of major osteoporotic fracture is 15.3%, and of hip fracture is 4.5%, based on left femoral neck BMD.    Prior Major risk 2 years prior was 12.7% and hip fx risk 3.2%    Today: comes for followup after starting reclast.  No falls or fractures since last visit.  Tolerating prolia well.  Due for next dose in December.  States she will be leaving the state from sept through spring but starting next year.     REVIEW OF SYSTEMS    10 system ROS otherwise as per the HPI or negative    Past Medical History  Past Medical History:   Diagnosis Date     Basal cell carcinoma of hand 01/01/2013     Basal cell carcinoma of leg 01/01/2012     DVT (deep venous thrombosis) (H) 03/29/2018    Not seen but assumed since PE     Eosinophilic esophagitis      Generalized osteoarthritis      HPV in female      Osteopenia      Pulmonary emboli (H) 03/01/2018     Synovial cyst popliteal space     Left        Medications  Current Outpatient Medications   Medication Sig Dispense Refill     BIOTIN PO biotin       Cholecalciferol (VITAMIN D3 PO) Take by mouth daily        "Collagen Hydrolysate, Bovine, POWD collagen (bovine)       esomeprazole (NEXIUM) 20 MG DR capsule Take 20 mg by mouth every morning (before breakfast) Take 30-60 minutes before eating.       lisinopril (ZESTRIL) 5 MG tablet TAKE 1 TABLET BY MOUTH EVERY DAY 90 tablet 3     MAGNESIUM GLUCONATE PO Take 200 mg by mouth       Omega-3 Fatty Acids (FISH OIL PO) Fish Oil       Oyster Shell (OYSTER CALCIUM PO) Take 1,000 mg by mouth daily       potassium chloride ER (MICRO-K) 10 MEQ CR capsule Take 1 tablet by mouth       rosuvastatin (CRESTOR) 10 MG tablet Take 1 tablet (10 mg) by mouth daily 90 tablet 3     UNABLE TO FIND daily MEDICATION NAME: tumeric       losartan (COZAAR) 25 MG tablet TAKE 1 TABLET BY MOUTH EVERY DAY (Patient not taking: Reported on 9/27/2023) 90 tablet 3     valACYclovir (VALTREX) 1000 mg tablet Take 1 tablet (1,000 mg) by mouth 3 times daily (Patient not taking: Reported on 9/27/2023) 21 tablet 3     VITAMIN E COMPLEX PO Take by mouth daily (Patient not taking: Reported on 9/27/2023)         Allergies  No Known Allergies        Family History  family history includes Cerebrovascular Disease in her mother; Lung Cancer in her father.    Social History  Social History     Tobacco Use     Smoking status: Never     Smokeless tobacco: Never   Substance Use Topics     Alcohol use: Yes     Alcohol/week: 3.3 standard drinks of alcohol     Comment: Alcoholic Drinks/day: Occ     Drug use: No       Physical Exam  /80 (BP Location: Left arm, Patient Position: Sitting, Cuff Size: Adult Regular)   Pulse 75   Resp 14   Ht 1.556 m (5' 1.25\")   Wt 60.3 kg (133 lb)   SpO2 98%   BMI 24.93 kg/m    Body mass index is 24.93 kg/m .    Physical Exam    GENERAL :  In no apparent distress  SKIN: Normal color, normal temperature     EYES: EOMI, No scleral icterus  NECK: No visible masses  RESP: No respiratory distress, normal effort  CARDIO: regular rate  ABDOMEN: flat, nondistended       NEURO: awake, alert, " responds appropriately to questions   EXTREMITIES: No clubbing, cyanosis or edema.    I spent  27 minutes on today's visit established patient visit on chart review, review of imaging, review of labs, exam, coordinating care, and in counseling with patient.      Again, thank you for allowing me to participate in the care of your patient.        Sincerely,        Ollie Ro MD

## 2023-10-10 ENCOUNTER — ANCILLARY PROCEDURE (OUTPATIENT)
Dept: BONE DENSITY | Facility: CLINIC | Age: 78
End: 2023-10-10
Attending: INTERNAL MEDICINE
Payer: MEDICARE

## 2023-10-10 DIAGNOSIS — M81.8 OTHER OSTEOPOROSIS WITHOUT CURRENT PATHOLOGICAL FRACTURE: ICD-10-CM

## 2023-10-10 DIAGNOSIS — Z78.0 POST-MENOPAUSAL: ICD-10-CM

## 2023-10-10 PROCEDURE — 77081 DXA BONE DENSITY APPENDICULR: CPT | Mod: TC | Performed by: PHYSICIAN ASSISTANT

## 2023-10-10 PROCEDURE — 77080 DXA BONE DENSITY AXIAL: CPT | Mod: TC | Performed by: PHYSICIAN ASSISTANT

## 2023-10-16 ENCOUNTER — TELEPHONE (OUTPATIENT)
Dept: ENDOCRINOLOGY | Facility: CLINIC | Age: 78
End: 2023-10-16
Payer: MEDICARE

## 2023-10-16 NOTE — TELEPHONE ENCOUNTER
Patient send comment via [facility] patient survey:    I'd like a follow up with a (Doctor, nurse, PA ) to explain the results that are posted in MYCHART.    Please call patient to go over her MyChart Dexa results.

## 2023-10-30 ENCOUNTER — TELEPHONE (OUTPATIENT)
Dept: ENDOCRINOLOGY | Facility: CLINIC | Age: 78
End: 2023-10-30

## 2023-10-30 DIAGNOSIS — M81.8 OTHER OSTEOPOROSIS WITHOUT CURRENT PATHOLOGICAL FRACTURE: Primary | ICD-10-CM

## 2023-10-31 ENCOUNTER — TELEPHONE (OUTPATIENT)
Dept: ENDOCRINOLOGY | Facility: CLINIC | Age: 78
End: 2023-10-31

## 2023-10-31 ENCOUNTER — LAB (OUTPATIENT)
Dept: LAB | Facility: CLINIC | Age: 78
End: 2023-10-31
Payer: MEDICARE

## 2023-10-31 DIAGNOSIS — M81.8 OTHER OSTEOPOROSIS WITHOUT CURRENT PATHOLOGICAL FRACTURE: Primary | ICD-10-CM

## 2023-10-31 DIAGNOSIS — M81.8 OTHER OSTEOPOROSIS WITHOUT CURRENT PATHOLOGICAL FRACTURE: ICD-10-CM

## 2023-10-31 LAB
CALCIUM SERPL-MCNC: 9.7 MG/DL (ref 8.8–10.2)
CREAT SERPL-MCNC: 0.63 MG/DL (ref 0.51–0.95)
EGFRCR SERPLBLD CKD-EPI 2021: 89 ML/MIN/1.73M2

## 2023-10-31 PROCEDURE — 82565 ASSAY OF CREATININE: CPT

## 2023-10-31 PROCEDURE — 36415 COLL VENOUS BLD VENIPUNCTURE: CPT

## 2023-10-31 PROCEDURE — 82310 ASSAY OF CALCIUM: CPT

## 2023-10-31 NOTE — TELEPHONE ENCOUNTER
----- Message from Nehemias Dowling sent at 10/23/2023  8:10 AM CDT -----  Regarding: Proali  Hello,    The Prolia order for this patient expires on 10/26/2023.  May I have your team place a new order?    Thank you,    Nehemias Soriano

## 2023-10-31 NOTE — TELEPHONE ENCOUNTER
Order for Prolia pended. Patient has her next injection scheduled for 11/7/23 in [place].    Please review and sign order if ok.    Kitty Ludwig RN on 10/31/2023 at 10:35 AM

## 2023-11-07 ENCOUNTER — ALLIED HEALTH/NURSE VISIT (OUTPATIENT)
Dept: ENDOCRINOLOGY | Facility: CLINIC | Age: 78
End: 2023-11-07
Payer: MEDICARE

## 2023-11-07 DIAGNOSIS — M81.8 OTHER OSTEOPOROSIS WITHOUT CURRENT PATHOLOGICAL FRACTURE: Primary | ICD-10-CM

## 2023-11-07 PROCEDURE — 96372 THER/PROPH/DIAG INJ SC/IM: CPT | Performed by: INTERNAL MEDICINE

## 2023-11-07 PROCEDURE — 99207 PR NO CHARGE NURSE ONLY: CPT

## 2023-11-07 NOTE — PROGRESS NOTES
Clinic Administered Medication Documentation      Prolia Documentation    Indication: Prolia  (denosumab) is a prescription medicine used to treat osteoporosis in patients who:   Are at high risk for fracture, meaning patients who have had a fracture related to osteoporosis, or who have multiple risk factors for fracture.  Cannot use another osteoporosis medicine or other osteoporosis medicines did not work well.  The timeline for early/late injections would be 4 weeks early and any time after the 6 month ana lilia. If a patient receives their injection late, then the subsequent injection would be 6 months from the date that they actually received the injection.    When was the last injection?  23  Was the last injection at least 6 months ago? Yes  Has the prior authorization been completed?  Yes  Is there an active order (written within the past 365 days, with administrations remaining, not ) in the chart?  Yes  Patient denies any dental work involving the bone (e.g. tooth extraction or dental implants) in the past 4 weeks?  Yes  Patient denies plans for any dental work involving the bone (e.g. tooth extraction or dental implants) in the next 4 weeks? Yes    The following steps were completed to comply with the REMS program for Prolia:  Reviewed information in the Medication Guide and Patient Counseling Chart, including the serious risks of Prolia  and the symptoms of each risk.  Advised patient to seek prompt medical attention if they have signs or symptoms of any of the serious risks.  Provided each patient a copy of the Medication Guide and Patient Brochure.    Prior to injection, verified patient identity using patient's name and date of birth. Medication was administered. Please see MAR and medication order for additional information. Patient instructed to remain in clinic for 15 minutes and report any adverse reaction to staff immediately.    Vial/Syringe: Syringe  Was this medication supplied by the  "patient? No  Verified that the patient has refills remaining in their prescription.    Prolia Injection Phone Screen      Screening questions have been asked 2-3 days prior to administration visit for Prolia. If any questions are answered with \"Yes,\" this phone encounter were will routed to ordering provider for further evaluation.     1.  When was the last injection?  5/2/23    2.  Has insurance for this injection been verified?  Yes    3.  Did you experience any new onset achiness or rashes that lasted for over a month with your previous Prolia injection?   No    4.  Do you have a fever over 101?F or a new deep cough that is unusual for you today? No    5.  Have you started any new medications in the last 6 months that you were told could affect your immune system? These may have been prescribed by oncologist, transplant, rheumatology, or dermatology.   No    6.  In the last 6 months have you have gastric bypass or parathyroid surgery?   No    7.  Do you plan dental work requiring drilling into the bone such as implants/extractions or oral surgery in the next 2-3 months?   No    8. Do you have new insurance since the last injection?    Patient informed if symptoms discussed above present prior to their administration appointment, they are to notify clinic immediately.     Breann Lua RN          The following steps were completed to comply with the REMS program for Prolia:  1. Ordering provider has previously reviewed information in the Medication Guide and Patient Counseling Chart, including the serious risks of Prolia  and the symptoms of each risk and have been advised to seek prompt medical attention if they have signs or symptoms of any of the serious risks.  2. Provided each patient a copy of the Medication Guide and Patient Brochure.  See MAR for administration details.   Indication: Prolia  (denosumab) is a prescription medicine used to treat osteoporosis in patients who:   Are at high risk for " fracture, meaning patients who have had a fracture related to osteoporosis, or who have multiple risk factors for fracture; Cannot use another osteoporosis medicine or other osteoporosis medicines did not work well.   The timeline for early/late injections would be 4 weeks early and any time after the 6 month ana lilia. If a patient receives their injection late, then the subsequent injection would be 6 months from the date that they actually received the injection    Have the screening questions been asked prior to this administration? Yes

## 2023-11-12 ENCOUNTER — HOSPITAL ENCOUNTER (EMERGENCY)
Facility: HOSPITAL | Age: 78
Discharge: HOME | End: 2023-11-12
Admitting: PHYSICIAN ASSISTANT
Payer: MEDICARE

## 2023-11-12 VITALS
WEIGHT: 132 LBS | RESPIRATION RATE: 17 BRPM | TEMPERATURE: 97.9 F | BODY MASS INDEX: 23.39 KG/M2 | OXYGEN SATURATION: 98 % | HEART RATE: 82 BPM | SYSTOLIC BLOOD PRESSURE: 179 MMHG | HEIGHT: 63 IN | DIASTOLIC BLOOD PRESSURE: 105 MMHG

## 2023-11-12 DIAGNOSIS — I10 BENIGN ESSENTIAL HYPERTENSION: ICD-10-CM

## 2023-11-12 DIAGNOSIS — M62.838 MUSCLE SPASM: ICD-10-CM

## 2023-11-12 DIAGNOSIS — M54.2 NECK PAIN: ICD-10-CM

## 2023-11-12 PROCEDURE — 250N000011 HC RX IP 250 OP 636: Mod: JZ | Performed by: PHYSICIAN ASSISTANT

## 2023-11-12 PROCEDURE — 96372 THER/PROPH/DIAG INJ SC/IM: CPT | Performed by: PHYSICIAN ASSISTANT

## 2023-11-12 PROCEDURE — 99284 EMERGENCY DEPT VISIT MOD MDM: CPT | Mod: 25

## 2023-11-12 PROCEDURE — 250N000013 HC RX MED GY IP 250 OP 250 PS 637: Performed by: PHYSICIAN ASSISTANT

## 2023-11-12 RX ORDER — KETOROLAC TROMETHAMINE 15 MG/ML
15 INJECTION, SOLUTION INTRAMUSCULAR; INTRAVENOUS ONCE
Status: COMPLETED | OUTPATIENT
Start: 2023-11-12 | End: 2023-11-12

## 2023-11-12 RX ORDER — DIAZEPAM 5 MG
5 TABLET ORAL EVERY 6 HOURS PRN
Qty: 10 TABLET | Refills: 0 | Status: SHIPPED | OUTPATIENT
Start: 2023-11-12 | End: 2024-04-18

## 2023-11-12 RX ORDER — LIDOCAINE 4 G/G
2 PATCH TOPICAL EVERY 24 HOURS
Status: DISCONTINUED | OUTPATIENT
Start: 2023-11-12 | End: 2023-11-12 | Stop reason: HOSPADM

## 2023-11-12 RX ORDER — NAPROXEN 500 MG/1
500 TABLET ORAL 2 TIMES DAILY WITH MEALS
Qty: 24 TABLET | Refills: 0 | Status: SHIPPED | OUTPATIENT
Start: 2023-11-12 | End: 2024-04-18

## 2023-11-12 RX ORDER — NAPROXEN 500 MG/1
500 TABLET ORAL 2 TIMES DAILY WITH MEALS
Qty: 24 TABLET | Refills: 0 | Status: SHIPPED | OUTPATIENT
Start: 2023-11-12 | End: 2023-11-12

## 2023-11-12 RX ADMIN — LIDOCAINE 2 PATCH: 4 PATCH TOPICAL at 08:55

## 2023-11-12 RX ADMIN — KETOROLAC TROMETHAMINE 15 MG: 15 INJECTION, SOLUTION INTRAMUSCULAR; INTRAVENOUS at 08:57

## 2023-11-12 ASSESSMENT — ACTIVITIES OF DAILY LIVING (ADL): ADLS_ACUITY_SCORE: 35

## 2023-11-12 NOTE — ED TRIAGE NOTES
Patient presents here with neck stiffness and difficulty with side to side movement of her head. She notes that one week ago, she did strike her head on an open locker door. Her neck  pain and stiffness began two days ago upon waking up. She was feeling well prior to that (Thursday night). She has used heat and Tylenol with no improvement with her symptoms.

## 2023-11-12 NOTE — DISCHARGE INSTRUCTIONS
Continue with tylenol as needed for pain.  Lidocaine creams and patches can also be really helpful.  Continue heat and gentle massage/stretching.    Use naproxen 500 mg twice daily (prescribed). Take with food to prevent stomach upset. Do not use ibuprofen or other NSAIDs while taking this medicine, as it can upset your stomach.    For muscle spasm, you can use valium. I would start with 1/2 tablet (2.5 mg) to see how you respond. If it is not enough to help with your pain and you are not feeling dizzy or significantly fatigued/drowsy from it, you could take a whole 5 mg dose.  You have been prescribed a benzodiazepine, VALIUM. Side effects of this medication includes sedation, unsteadiness, lightheadedness/dizziness,confusion, depression, or weakness. These medications are addictive with long-term use, so please use sparingly.   Additionally, benzodiazepines are medications that are sedating (will make you sleepy), so do not drive oroperate machinery while taking this medication. Avoid alcohol or other sedating medications, such as narcotics, while taking benzodiazepines due to risk for increased sedation and difficulty breathing that could be lifethreatening.   If you are on this medication for a long period of time, please talk to your primary care provider before abruptly discontinuing this medication due to risk of withdrawal.     Follow up in clinic in 2-3 days for re-check.  Return to the emergency department if you develop worsening pain, headaches, vision changes, chest pain, shortness of breath, or any other concerning symptoms. We would be happy to see you.

## 2023-11-12 NOTE — ED PROVIDER NOTES
EMERGENCY DEPARTMENT ENCOUNTER      NAME: Ivis Cook  AGE: 78 year old female  YOB: 1945  MRN: [id number]  EVALUATION DATE & TIME: 2023  8:24 AM    PCP: Addie Martinez    ED PROVIDER: Karla Quigley PA-C      Chief Complaint   Patient presents with    Neck Pain         FINAL IMPRESSION:  1. Neck pain    2. Muscle spasm    3. Benign essential hypertension          ED COURSE & MEDICAL DECISION MAKIN:29 AM I introduced myself to patient, performed initial HPI and examination.     78 year old female with PMH osteoporosis, HTN, DVT, pulmonary embolism presents to the Emergency Department for evaluation of neck pain x 2 days, woke up with pain. Pain worse with movement of her neck. Bilateral pain but L>R. Patient does report head injury 1 week ago (hit her head on a locker, no LOC, not on anticoagulation). Did water aerobics Friday as she typically would. Then woke with pain on Saturday morning.    Vital signs notable for HTN (History of HTN). Exam with reproducible and palpable muscle spasm especially along sternocleidomastoid musculature bilaterally, more prominent on left and associated tenderness into trapezius musculature.     No dizziness, no chest pain or shortness of breath. Not consistent with vertebral artery dissection, no indication for CTA at this time. Without chest pain or shortness of breath and reproducible pain/muscle spasm, this is not consistent with ACS and thus EKG/troponin deferred. Does report head pressure, but suspect this is referred pain from neck. Neurologically intact. Not consistent with meningitis. Low mechanism of injury (hit a locker), and symptoms 1 week ago with symptoms only starting 2 days ago. I do not believe remote injury is related to current pain. Not consistent with intracranial hemorrhage or concussion. No indication for head CT at this time.    Patient was given lidocaine patch and toradol in the emergency department, feels improved.  Will discharge with valium as well for muscle spasm, discussed appropriate use. Instructed on close follow up with PCP and red flags/indications to return to the emergency department. All questions were answered to the best of my ability and patient is agreeable with plan.     Medical Decision Making    History:  Supplemental history from: Documented in chart, if applicable  External Record(s) reviewed: Documented in chart, if applicable.    Work Up:  Chart documentation includes differential considered and any EKGs or imaging independently interpreted by provider.  In additional to work up documented, I considered the following work up: Documented in chart, if applicable.    External consultation:  Discussion of management with another provider: Documented in chart, if applicable    Complicating factors:  Care impacted by chronic illness: Other: See above  Care affected by social determinants of health: N/A    Disposition considerations: Discharge. I prescribed additional prescription strength medication(s) as charted. See documentation for any additional details.      MEDICATIONS GIVEN IN THE EMERGENCY:  Medications   ketorolac (TORADOL) injection 15 mg (15 mg Intramuscular $Given 11/12/23 0857)       NEW PRESCRIPTIONS STARTED AT TODAY'S ER VISIT  Discharge Medication List as of 11/12/2023  9:30 AM        START taking these medications    Details   diazepam (VALIUM) 5 MG tablet Take 1 tablet (5 mg) by mouth every 6 hours as needed for muscle spasms, Disp-10 tablet, R-0, Local Print                =================================================================    HPI    Patient information was obtained from: Patient    Use of : N/A         Ivis Cook is a 78 year old female with a pertinent history of osteoporosis, HTN, DVT, pulmonary embolism who presents to this ED by private car for evaluation of bilateral/posterior neck pain. Reports pain started yesterday when waking up. Pain with attempting  "to move her neck in any direction. Feels head \"pressure\" and 'heaviness\". No vision changes, no photophobia. Some mild nausea, no vomiting. No numbness or weakness to extremities.     Patient did water aerobics yesterday per usual. No changes in pillows/sleeping position.  Does report hitting her head on a locker one week ago, no LOC at that time, no symptoms prior to yesterday. Not on any anticoagulation.     Has been managing pain with tylenol and heat, minimal relief.     Patient did drive herself here today.        REVIEW OF SYSTEMS   ROS negative unless otherwise stated in HPI    PAST MEDICAL HISTORY:  Past Medical History:   Diagnosis Date    Basal cell carcinoma of hand 01/01/2013    Basal cell carcinoma of leg 01/01/2012    DVT (deep venous thrombosis) (H) 03/29/2018    Not seen but assumed since PE    Eosinophilic esophagitis     Generalized osteoarthritis     HPV in female     Osteopenia     Pulmonary emboli (H) 03/01/2018    Synovial cyst popliteal space     Left        PAST SURGICAL HISTORY:  Past Surgical History:   Procedure Laterality Date    HC CORRECT BUNION,OROZCO/NUBIA/JUSTUS      Description: Bunion Correction By Cheilectomy;  Recorded: 08/24/2009;  Comments: Duc1985     REMOVAL OF TONSILS,<11 Y/O      Description: Tonsillectomy;  Recorded: 08/24/2009;  Comments: Age 3    HYSTERECTOMY  01/01/1993    Total and ceervix removed-Benign, endometriosis    OOPHORECTOMY Bilateral 01/01/1993    OTHER SURGICAL HISTORY Left 01/01/2017    left knee arthroplasty    [place] ARTHRODESIS ANT INTERBODY MIN DISCECTOMY,LUMBAR      Description: Lumbar Vertebral Fusion;  Recorded: 08/24/2009;  Comments: L5-S1 with laminectomy x 2-1976 and 1977    [place] EXCISE HAND/FOOT NEUROMA      Description: Excision Of Neuroma Of Foot;  Recorded: 08/24/2009;  Comments: Padmini-1987    [facility] APPENDECTOMY      Description: Appendectomy;  Recorded: 08/24/2009;  Comments: Cecy RODRIGUEZ TOTAL KNEE ARTHROPLASTY Right 06/05/2017    Procedure: " "RIGHT TOTAL KNEE ARTHROPLASTY;  Surgeon: Donaldo Ron DO;  Location: [facility];  Service: Orthopedics       CURRENT MEDICATIONS:    diazepam (VALIUM) 5 MG tablet  naproxen (NAPROSYN) 500 MG tablet  BIOTIN PO  Cholecalciferol (VITAMIN D3 PO)  Collagen Hydrolysate, Bovine, POWD  esomeprazole (NEXIUM) 20 MG DR capsule  lisinopril (ZESTRIL) 5 MG tablet  losartan (COZAAR) 25 MG tablet  MAGNESIUM GLUCONATE PO  Omega-3 Fatty Acids (FISH OIL PO)  Oyster Shell (OYSTER CALCIUM PO)  potassium chloride ER (MICRO-K) 10 MEQ CR capsule  rosuvastatin (CRESTOR) 10 MG tablet  UNABLE TO FIND  valACYclovir (VALTREX) 1000 mg tablet  VITAMIN E COMPLEX PO        ALLERGIES:  No Known Allergies    FAMILY HISTORY:  Family History   Problem Relation Age of Onset    Lung Cancer Father     Cerebrovascular Disease Mother        SOCIAL HISTORY:   Social History     Socioeconomic History    Marital status:    Tobacco Use    Smoking status: Never    Smokeless tobacco: Never   Substance and Sexual Activity    Alcohol use: Yes     Alcohol/week: 3.3 standard drinks of alcohol     Comment: Alcoholic Drinks/day: Occ    Drug use: No    Sexual activity: Never       VITALS:  BP (!) 179/105   Pulse 82   Temp 97.9  F (36.6  C) (Oral)   Resp 17   Ht 1.6 m (5' 3\")   Wt 59.9 kg (132 lb)   SpO2 98%   BMI 23.38 kg/m      PHYSICAL EXAM    Constitutional: Well developed, Well nourished, NAD, GCS 15   HENT: Normocephalic, Atraumatic, Oropharynx clear  Neck- Supple, limited ROM secondary to pain but no rigidity. Tenderness and palpable muscle tenderness along the sternocleidomastoid musculature especially, L>R and some tightness with palpation to the trapezius musculature. No midline tenderness.   Eyes: Conjunctiva normal. PERRL. EOM intact. No photophobia or nystagmus.   Respiratory: No respiratory distress, speaking in full sentences. Normal breath sounds, No wheezing  Cardiovascular: Normal heart rate, Regular rhythm, No murmurs.  "   Musculoskeletal: No deformities, Moves all extremities equally  Integument: Warm, Dry, No erythema, ecchymosis, or rash.  Neurologic: Alert & oriented x 3, Normal sensory function. No focal deficits  Psychiatric: Affect normal, Judgment normal, Mood normal. Cooperative.     LAB:  All pertinent labs reviewed and interpreted.       RADIOLOGY:  Reviewed all pertinent imaging. Please see official radiology report.  No orders to display       EKG:    None    PROCEDURES:   None        Karla Quigley PA-C  Emergency Medicine  [facility] EMERGENCY DEPARTMENT  [facility] [id number] [phone]             Karla Quigley PA-C  11/12/23 [id number]

## 2023-11-29 ENCOUNTER — TRANSFERRED RECORDS (OUTPATIENT)
Dept: HEALTH INFORMATION MANAGEMENT | Facility: CLINIC | Age: 78
End: 2023-11-29
Payer: MEDICARE

## 2023-12-01 NOTE — PROGRESS NOTES
"Prolia Injection Phone Screen      Screening questions have been asked 2-3 days prior to administration visit for Prolia. If any questions are answered with \"Yes,\" this phone encounter were will routed to ordering provider for further evaluation.     1.  When was the last injection?  first    2.  Has insurance for this injection been verified?  Yes    3.  Did you experience any new onset achiness or rashes that lasted for over a month with your previous Prolia injection?   No    4.  Do you have a fever over 101?F or a new deep cough that is unusual for you today? No    5.  Have you started any new medications in the last 6 months that you were told could affect your immune system? These may have been prescribed by oncologist, transplant, rheumatology, or dermatology.   No    6.  In the last 6 months have you have gastric bypass or parathyroid surgery?   No    7.  Do you plan dental work requiring drilling into the bone such as implants/extractions or oral surgery in the next 2-3 months?   No    8. Do you have new insurance since the last injection?    Patient informed if symptoms discussed above present prior to their administration appointment, they are to notify clinic immediately.     Taisha SANTIAGO RN          The following steps were completed to comply with the REMS program for Prolia:  1. Ordering provider has previously reviewed information in the Medication Guide and Patient Counseling Chart, including the serious risks of Prolia  and the symptoms of each risk and have been advised to seek prompt medical attention if they have signs or symptoms of any of the serious risks.  2. Provided each patient a copy of the Medication Guide and Patient Brochure.  See MAR for administration details.   Indication: Prolia  (denosumab) is a prescription medicine used to treat osteoporosis in patients who:   Are at high risk for fracture, meaning patients who have had a fracture related to osteoporosis, or who have " ----- Message from Merary Martel multiple risk factors for fracture; Cannot use another osteoporosis medicine or other osteoporosis medicines did not work well.   The timeline for early/late injections would be 4 weeks early and any time after the 6 month ana lilia. If a patient receives their injection late, then the subsequent injection would be 6 months from the date that they actually received the injection    Have the screening questions been asked prior to this administration? Yes    The following medication was given:     MEDICATION: Denosumab (Prolia) 60 mg/ml SOLN  ROUTE: SQ  SITE: Arm - Right  DOSE: 60mg  LOT #: [id number]  :  [employer]  EXPIRATION DATE:  04/30/25

## 2023-12-06 ENCOUNTER — TRANSFERRED RECORDS (OUTPATIENT)
Dept: HEALTH INFORMATION MANAGEMENT | Facility: CLINIC | Age: 78
End: 2023-12-06
Payer: MEDICARE

## 2024-03-13 ENCOUNTER — PATIENT OUTREACH (OUTPATIENT)
Dept: CARE COORDINATION | Facility: CLINIC | Age: 79
End: 2024-03-13
Payer: MEDICARE

## 2024-03-29 DIAGNOSIS — Z79.83 GASTROINTESTINAL INTOLERANCE TO BISPHOSPHONATES: ICD-10-CM

## 2024-03-29 DIAGNOSIS — M81.0 AGE-RELATED OSTEOPOROSIS WITHOUT CURRENT PATHOLOGICAL FRACTURE: Primary | ICD-10-CM

## 2024-03-29 DIAGNOSIS — K90.49 GASTROINTESTINAL INTOLERANCE TO BISPHOSPHONATES: ICD-10-CM

## 2024-03-29 RX ORDER — DIPHENHYDRAMINE HYDROCHLORIDE 50 MG/ML
50 INJECTION INTRAMUSCULAR; INTRAVENOUS
Start: 2024-04-05

## 2024-03-29 RX ORDER — METHYLPREDNISOLONE SODIUM SUCCINATE 125 MG/2ML
125 INJECTION, POWDER, LYOPHILIZED, FOR SOLUTION INTRAMUSCULAR; INTRAVENOUS
Start: 2024-04-05

## 2024-03-29 RX ORDER — EPINEPHRINE 1 MG/ML
0.3 INJECTION, SOLUTION, CONCENTRATE INTRAVENOUS EVERY 5 MIN PRN
OUTPATIENT
Start: 2024-04-05

## 2024-03-29 RX ORDER — ALBUTEROL SULFATE 0.83 MG/ML
2.5 SOLUTION RESPIRATORY (INHALATION)
OUTPATIENT
Start: 2024-04-05

## 2024-03-29 RX ORDER — MEPERIDINE HYDROCHLORIDE 25 MG/ML
25 INJECTION INTRAMUSCULAR; INTRAVENOUS; SUBCUTANEOUS EVERY 30 MIN PRN
OUTPATIENT
Start: 2024-04-05

## 2024-03-29 RX ORDER — ALBUTEROL SULFATE 90 UG/1
1-2 AEROSOL, METERED RESPIRATORY (INHALATION)
Start: 2024-04-05

## 2024-03-29 NOTE — PROGRESS NOTES
Reordered mannie Branch MD  Staff Physician  Division of Endocrinology  [facility]  Pager #[phone]

## 2024-04-06 ENCOUNTER — TELEPHONE (OUTPATIENT)
Dept: FAMILY MEDICINE | Facility: CLINIC | Age: 79
End: 2024-04-06
Payer: MEDICARE

## 2024-04-06 NOTE — TELEPHONE ENCOUNTER
FYI - Status Update    Who is Calling: patient    Update: pt looking for date of last prolia injection. I wasn't sure where to direct her to find this on [employer] or where to find on epic.    Pretty sure it was 5/2/24, but epic denies scheduling - please call pt back to coordinate.    Does caller want a call/response back: Yes     Could we send this information to you in [employer] or would you prefer to receive a phone call?:   Patient would prefer a phone call     Okay to leave a detailed message?: Yes at Cell number on file:    Telephone Information:   Mobile [phone]

## 2024-04-08 DIAGNOSIS — E78.00 PURE HYPERCHOLESTEROLEMIA, UNSPECIFIED: ICD-10-CM

## 2024-04-08 RX ORDER — ROSUVASTATIN CALCIUM 10 MG/1
10 TABLET, COATED ORAL DAILY
Qty: 90 TABLET | Refills: 0 | Status: SHIPPED | OUTPATIENT
Start: 2024-04-08 | End: 2024-07-09

## 2024-04-08 NOTE — TELEPHONE ENCOUNTER
Called and spoke with patient. Provided patient with date of last Prolia injection as well as future date for next labs and injection.    Patient reports additional concern of a small sore on her shin x 1 week. Had a wart-like growth on the skin that she had picked off and now wound not healing as she would expect. Small area of redness around wound, denies fever. Clear drainage. Offered appointment for today, patient politely declined and was scheduled for tomorrow (4/9), advised to be seen sooner if any expanding redness or fever develops. Patient stated understanding and is in agreement with plan.    Deborah Medina RN

## 2024-04-09 ENCOUNTER — OFFICE VISIT (OUTPATIENT)
Dept: FAMILY MEDICINE | Facility: CLINIC | Age: 79
End: 2024-04-09
Payer: MEDICARE

## 2024-04-09 VITALS
DIASTOLIC BLOOD PRESSURE: 70 MMHG | TEMPERATURE: 97.7 F | WEIGHT: 134.6 LBS | OXYGEN SATURATION: 95 % | HEART RATE: 79 BPM | BODY MASS INDEX: 23.85 KG/M2 | SYSTOLIC BLOOD PRESSURE: 126 MMHG | RESPIRATION RATE: 16 BRPM | HEIGHT: 63 IN

## 2024-04-09 DIAGNOSIS — S81.802A WOUND OF LEFT LOWER EXTREMITY, INITIAL ENCOUNTER: Primary | ICD-10-CM

## 2024-04-09 DIAGNOSIS — Z85.828 HISTORY OF SKIN CANCER: ICD-10-CM

## 2024-04-09 PROCEDURE — 99213 OFFICE O/P EST LOW 20 MIN: CPT | Performed by: FAMILY MEDICINE

## 2024-04-09 RX ORDER — RESPIRATORY SYNCYTIAL VIRUS VACCINE 120MCG/0.5
0.5 KIT INTRAMUSCULAR ONCE
Qty: 1 EACH | Refills: 0 | Status: CANCELLED | OUTPATIENT
Start: 2024-04-09 | End: 2024-04-09

## 2024-04-09 RX ORDER — CEPHALEXIN 500 MG/1
500 CAPSULE ORAL 3 TIMES DAILY
Qty: 30 CAPSULE | Refills: 0 | Status: SHIPPED | OUTPATIENT
Start: 2024-04-09 | End: 2024-04-19

## 2024-04-09 NOTE — PATIENT INSTRUCTIONS
Ivis,    I sent a prescription for cephalexin to your pharmacy  Keep the wound clean and try to keep it dry in the short-term  Follow-up with dermatology as planned  Monitor for signs and symptoms of infection follow-up immediately if there are concerns with increasing redness, red streaks, or a discharge of pus    Brian Prieto MD

## 2024-04-09 NOTE — PROGRESS NOTES
Assessment & Plan     Wound of left lower extremity, initial encounter    There is a small wound of the left shin  There is some surrounding redness indicative of low-grade infection  Treat with a course of cephalexin   Recommend keeping the wound clean and dry  Follow-up with dermatology is recommended  Monitor for signs and symptoms of worsening infection.    Recommend immediate follow-up if there are concerns such as red streaks, purulent discharge, pain, or fever    - cephALEXin (KEFLEX) 500 MG capsule; Take 1 capsule (500 mg) by mouth 3 times daily for 10 days    History of skin cancer    She is scheduled for dermatology next week per her report                  Subjective   Ivis is a 78 year old, presenting for the following health issues:  Wound Check (Wound on left shin x 1 wk)        4/9/2024    11:04 AM   Additional Questions   Roomed by Cheri ORLAND CMA     Wound Check    History of Present Illness       Reason for visit:  Sore on chin left leg    She eats 2-3 servings of fruits and vegetables daily.She consumes 0 sweetened beverage(s) daily.She exercises with enough effort to increase her heart rate 20 to 29 minutes per day.  She exercises with enough effort to increase her heart rate 7 days per week.   She is taking medications regularly.     Ivis is a pleasant 78-year-old female, patient of Dr. Escalante, who presents to the clinic as she has a wound on her left shin.  She states that this looked like a scab which came off.  She now has an open wound.  There has been some redness of the surrounding skin and some puffiness noted as well.  She does have a history of skin cancer including previous basal carcinoma and squamous cell carcinoma and does follow-up with dermatology on a regular basis.  She denies fever or chills.  She cannot think of a specific injury or bite but did spend time in [place] recently.                    Objective    /70 (BP Location: Left arm, Patient Position: Sitting,  "Cuff Size: Adult Regular)   Pulse 79   Temp 97.7  F (36.5  C) (Oral)   Resp 16   Ht 1.6 m (5' 3\")   Wt 61.1 kg (134 lb 9.6 oz)   SpO2 95%   BMI 23.84 kg/m    Body mass index is 23.84 kg/m .  Physical Exam   GENERAL: alert and no distress  MS: Examination of the left shin reveals an ulcerated wound measuring just over 1 cm in circumference  There is a clear discharge evident  There is some surrounding erythema noted and some puffiness of the skin  SKIN: no suspicious lesions or rashes  NEURO: Normal strength and tone, mentation intact and speech normal  PSYCH: mentation appears normal, affect normal/bright            Signed Electronically by: Brian Prieto MD    "

## 2024-04-16 DIAGNOSIS — M94.9 DISORDER OF BONE AND CARTILAGE: Primary | ICD-10-CM

## 2024-04-16 DIAGNOSIS — M89.9 DISORDER OF BONE AND CARTILAGE: Primary | ICD-10-CM

## 2024-04-16 RX ORDER — MEPERIDINE HYDROCHLORIDE 25 MG/ML
25 INJECTION INTRAMUSCULAR; INTRAVENOUS; SUBCUTANEOUS EVERY 30 MIN PRN
OUTPATIENT
Start: 2024-04-23

## 2024-04-16 RX ORDER — DIPHENHYDRAMINE HYDROCHLORIDE 50 MG/ML
50 INJECTION INTRAMUSCULAR; INTRAVENOUS
Start: 2024-04-23

## 2024-04-16 RX ORDER — ALBUTEROL SULFATE 0.83 MG/ML
2.5 SOLUTION RESPIRATORY (INHALATION)
OUTPATIENT
Start: 2024-04-23

## 2024-04-16 RX ORDER — EPINEPHRINE 1 MG/ML
0.3 INJECTION, SOLUTION, CONCENTRATE INTRAVENOUS EVERY 5 MIN PRN
OUTPATIENT
Start: 2024-04-23

## 2024-04-16 RX ORDER — METHYLPREDNISOLONE SODIUM SUCCINATE 125 MG/2ML
125 INJECTION, POWDER, LYOPHILIZED, FOR SOLUTION INTRAMUSCULAR; INTRAVENOUS
Start: 2024-04-23

## 2024-04-16 RX ORDER — ALBUTEROL SULFATE 90 UG/1
1-2 AEROSOL, METERED RESPIRATORY (INHALATION)
Start: 2024-04-23

## 2024-04-16 NOTE — TELEPHONE ENCOUNTER
New provider in place AUG 28 2024 with Dr Cody.   Previously with Dr Ro.   Prolia therapy plan was in place.   However, Pt is having prolia as CAM at [facility]  On call notified.   Kiera Mcarthur RN on 4/16/2024 at 2:16 PM              Labs  Received: Today  Taisha Briscoe RN  P [facility] Endocrinology Adult [facility]  Patient was previously seen by . Pt has Prolia scheduled for 5/8/24- pt will need lab orders placed for Prolia. Pt is scheduled for lab on 5/3. Please place orders.     Thank you,     Taisha

## 2024-04-18 ENCOUNTER — VIRTUAL VISIT (OUTPATIENT)
Dept: FAMILY MEDICINE | Facility: CLINIC | Age: 79
End: 2024-04-18
Payer: MEDICARE

## 2024-04-18 DIAGNOSIS — Z00.00 ENCOUNTER FOR ANNUAL WELLNESS EXAM IN MEDICARE PATIENT: Primary | ICD-10-CM

## 2024-04-18 DIAGNOSIS — I10 BENIGN ESSENTIAL HYPERTENSION: ICD-10-CM

## 2024-04-18 DIAGNOSIS — R93.89 ABNORMAL FINDINGS ON DIAGNOSTIC IMAGING OF OTHER SPECIFIED BODY STRUCTURES: ICD-10-CM

## 2024-04-18 DIAGNOSIS — M81.0 AGE-RELATED OSTEOPOROSIS WITHOUT CURRENT PATHOLOGICAL FRACTURE: ICD-10-CM

## 2024-04-18 DIAGNOSIS — E55.9 VITAMIN D DEFICIENCY: ICD-10-CM

## 2024-04-18 DIAGNOSIS — K20.0 EOSINOPHILIC ESOPHAGITIS: ICD-10-CM

## 2024-04-18 DIAGNOSIS — Z12.31 VISIT FOR SCREENING MAMMOGRAM: ICD-10-CM

## 2024-04-18 DIAGNOSIS — Z23 HIGH PRIORITY FOR 2019-NCOV VACCINE: ICD-10-CM

## 2024-04-18 DIAGNOSIS — E78.00 PURE HYPERCHOLESTEROLEMIA: ICD-10-CM

## 2024-04-18 PROCEDURE — 99214 OFFICE O/P EST MOD 30 MIN: CPT | Mod: 25 | Performed by: FAMILY MEDICINE

## 2024-04-18 PROCEDURE — G0439 PPPS, SUBSEQ VISIT: HCPCS | Performed by: FAMILY MEDICINE

## 2024-04-18 SDOH — HEALTH STABILITY: PHYSICAL HEALTH: ON AVERAGE, HOW MANY DAYS PER WEEK DO YOU ENGAGE IN MODERATE TO STRENUOUS EXERCISE (LIKE A BRISK WALK)?: 3 DAYS

## 2024-04-18 SDOH — HEALTH STABILITY: PHYSICAL HEALTH: ON AVERAGE, HOW MANY MINUTES DO YOU ENGAGE IN EXERCISE AT THIS LEVEL?: 20 MIN

## 2024-04-18 ASSESSMENT — SOCIAL DETERMINANTS OF HEALTH (SDOH)
HOW OFTEN DO YOU GET TOGETHER WITH FRIENDS OR RELATIVES?: THREE TIMES A WEEK
HOW OFTEN DO YOU GET TOGETHER WITH FRIENDS OR RELATIVES?: THREE TIMES A WEEK
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
## 2024-04-18 NOTE — PATIENT INSTRUCTIONS
She has a lab appointment set in May for some endocrinology labs.  I did order a lab panel and she will let the  know to draw these labs as well.    COVID shot ordered, she can set a nurse appointment.    Declined hepatitis B shots.    He does follow with endocrinology for osteoporosis and is on Prolia.  They take care of ordering the DEXA scans.    She does see dermatology for full body skin checks with history of skin cancer.    Is on a proton pump inhibitor, Nexium, for eosinophilic esophagitis and only needs to see GI if concerns.      Health Maintenance   Topic Date Due    URINE DRUG SCREEN  NA    LIPID  To set lab apt.    ANNUAL REVIEW OF HM ORDERS  Today    MEDICARE ANNUAL WELLNESS VISIT  Today    FALL RISK ASSESSMENT  Today    DEXA  10/10/2025, per endocrine    GLUCOSE  To set lab apt.    ADVANCE CARE PLANNING  Will bring copy in    DTAP/TDAP/TD IMMUNIZATION (3 - Td or Tdap) 07/29/2031    PHQ-2 (once per calendar year)  Completed    INFLUENZA VACCINE  Completed    Pneumococcal Vaccine: 65+ Years  Completed    ZOSTER IMMUNIZATION  Completed    RSV VACCINE (Pregnancy & 60+)  Completed    COVID-19 Vaccine  Completed    IPV IMMUNIZATION  Aged Out    HPV IMMUNIZATION  Aged Out    MENINGITIS IMMUNIZATION  Aged Out    RSV MONOCLONAL ANTIBODY  Aged Out    COLORECTAL CANCER SCREENING  Discontinued     Mammogram:  Due in Aug. and ordered

## 2024-04-25 ENCOUNTER — TRANSFERRED RECORDS (OUTPATIENT)
Dept: HEALTH INFORMATION MANAGEMENT | Facility: CLINIC | Age: 79
End: 2024-04-25
Payer: MEDICARE

## 2024-05-03 ENCOUNTER — LAB (OUTPATIENT)
Dept: LAB | Facility: CLINIC | Age: 79
End: 2024-05-03
Payer: MEDICARE

## 2024-05-03 ENCOUNTER — TELEPHONE (OUTPATIENT)
Dept: FAMILY MEDICINE | Facility: CLINIC | Age: 79
End: 2024-05-03

## 2024-05-03 ENCOUNTER — MYC MEDICAL ADVICE (OUTPATIENT)
Dept: FAMILY MEDICINE | Facility: CLINIC | Age: 79
End: 2024-05-03

## 2024-05-03 DIAGNOSIS — I10 BENIGN ESSENTIAL HYPERTENSION: ICD-10-CM

## 2024-05-03 DIAGNOSIS — M81.0 AGE-RELATED OSTEOPOROSIS WITHOUT CURRENT PATHOLOGICAL FRACTURE: ICD-10-CM

## 2024-05-03 DIAGNOSIS — E55.9 VITAMIN D DEFICIENCY: ICD-10-CM

## 2024-05-03 DIAGNOSIS — R93.89 ABNORMAL FINDINGS ON DIAGNOSTIC IMAGING OF OTHER SPECIFIED BODY STRUCTURES: ICD-10-CM

## 2024-05-03 DIAGNOSIS — E78.00 PURE HYPERCHOLESTEROLEMIA: ICD-10-CM

## 2024-05-03 LAB
ERYTHROCYTE [DISTWIDTH] IN BLOOD BY AUTOMATED COUNT: 13.4 % (ref 10–15)
HCT VFR BLD AUTO: 43 % (ref 35–47)
HGB BLD-MCNC: 14.5 G/DL (ref 11.7–15.7)
MCH RBC QN AUTO: 32.1 PG (ref 26.5–33)
MCHC RBC AUTO-ENTMCNC: 33.7 G/DL (ref 31.5–36.5)
MCV RBC AUTO: 95 FL (ref 78–100)
PLATELET # BLD AUTO: 350 10E3/UL (ref 150–450)
RBC # BLD AUTO: 4.52 10E6/UL (ref 3.8–5.2)
WBC # BLD AUTO: 8.3 10E3/UL (ref 4–11)

## 2024-05-03 PROCEDURE — 36415 COLL VENOUS BLD VENIPUNCTURE: CPT

## 2024-05-03 PROCEDURE — 80061 LIPID PANEL: CPT

## 2024-05-03 PROCEDURE — 84443 ASSAY THYROID STIM HORMONE: CPT

## 2024-05-03 PROCEDURE — 82306 VITAMIN D 25 HYDROXY: CPT

## 2024-05-03 PROCEDURE — 85027 COMPLETE CBC AUTOMATED: CPT

## 2024-05-03 PROCEDURE — 80053 COMPREHEN METABOLIC PANEL: CPT

## 2024-05-03 NOTE — TELEPHONE ENCOUNTER
Pt came in with back spasms that she had been seen for 6 mos or so ago in the ED & gotten Diazepam 5mg tablets for It.  She now has the spasms back & olnly has 3 pills left, which will be gone over the week end now.  We can't find any appts available with any one until [place] on Friday & she would like something sooner for the spasms.  Can you prescribe or fit her in?  She was just seen for her AWV in April 2024. Please contact at [phone].

## 2024-05-03 NOTE — TELEPHONE ENCOUNTER
Patient calling requesting a refill on Diazepam 5mg tablets for spasms in her buttock. It was last prescribed by an urgent care provider. I did send instructions for how to submit an evisit     Leeanne Cartagena RN

## 2024-05-04 LAB
ALBUMIN SERPL BCG-MCNC: 4.7 G/DL (ref 3.5–5.2)
ALP SERPL-CCNC: 37 U/L (ref 40–150)
ALT SERPL W P-5'-P-CCNC: 24 U/L (ref 0–50)
ANION GAP SERPL CALCULATED.3IONS-SCNC: 11 MMOL/L (ref 7–15)
AST SERPL W P-5'-P-CCNC: 22 U/L (ref 0–45)
BILIRUB SERPL-MCNC: 0.5 MG/DL
BUN SERPL-MCNC: 18 MG/DL (ref 8–23)
CALCIUM SERPL-MCNC: 9.8 MG/DL (ref 8.8–10.2)
CHLORIDE SERPL-SCNC: 99 MMOL/L (ref 98–107)
CHOLEST SERPL-MCNC: 169 MG/DL
CREAT SERPL-MCNC: 0.66 MG/DL (ref 0.51–0.95)
DEPRECATED HCO3 PLAS-SCNC: 24 MMOL/L (ref 22–29)
EGFRCR SERPLBLD CKD-EPI 2021: 88 ML/MIN/1.73M2
FASTING STATUS PATIENT QL REPORTED: YES
GLUCOSE SERPL-MCNC: 97 MG/DL (ref 70–99)
HDLC SERPL-MCNC: 86 MG/DL
LDLC SERPL CALC-MCNC: 65 MG/DL
NONHDLC SERPL-MCNC: 83 MG/DL
POTASSIUM SERPL-SCNC: 4.3 MMOL/L (ref 3.4–5.3)
PROT SERPL-MCNC: 7.1 G/DL (ref 6.4–8.3)
SODIUM SERPL-SCNC: 134 MMOL/L (ref 135–145)
TRIGL SERPL-MCNC: 91 MG/DL
TSH SERPL DL<=0.005 MIU/L-ACNC: 0.83 UIU/ML (ref 0.3–4.2)
VIT D+METAB SERPL-MCNC: 34 NG/ML (ref 20–50)

## 2024-05-04 NOTE — TELEPHONE ENCOUNTER
I sent her a [facility] message agreeing with our triage nurse to submit an e-visit.  I also told her I do not usually prescribe diazepam for muscle spasms as it is a controlled substance and a benzodiazepine.  I would be happy to prescribe her Flexeril when she submits an e-visit.

## 2024-05-06 NOTE — TELEPHONE ENCOUNTER
Attempted to call patient, left message for return call to clinic. Please see provider notation below when she returns call.    Deborah Medina RN

## 2024-05-06 NOTE — TELEPHONE ENCOUNTER
Patient has been advised multiple times to submit an E-visit for this concern.    Warm regards,    CATIE Chase     [facility] main phone #[phone]

## 2024-05-08 ENCOUNTER — ALLIED HEALTH/NURSE VISIT (OUTPATIENT)
Dept: ENDOCRINOLOGY | Facility: CLINIC | Age: 79
End: 2024-05-08
Payer: MEDICARE

## 2024-05-08 DIAGNOSIS — Z92.29 PERSONAL HISTORY OF DRUG THERAPY: ICD-10-CM

## 2024-05-08 DIAGNOSIS — M81.8 OTHER OSTEOPOROSIS WITHOUT CURRENT PATHOLOGICAL FRACTURE: Primary | ICD-10-CM

## 2024-05-08 PROCEDURE — 96372 THER/PROPH/DIAG INJ SC/IM: CPT | Performed by: INTERNAL MEDICINE

## 2024-05-08 PROCEDURE — 99207 PR NO CHARGE NURSE ONLY: CPT

## 2024-05-08 NOTE — PROGRESS NOTES
Clinic Administered Medication Documentation      Prolia Documentation    Indication: Prolia  (denosumab) is a prescription medicine used to treat osteoporosis in patients who:   Are at high risk for fracture, meaning patients who have had a fracture related to osteoporosis, or who have multiple risk factors for fracture.  Cannot use another osteoporosis medicine or other osteoporosis medicines did not work well.  The timeline for early/late injections would be 4 weeks early and any time after the 6 month ana lilia. If a patient receives their injection late, then the subsequent injection would be 6 months from the date that they actually received the injection.    When was the last injection?  23  Was the last injection at least 6 months ago? Yes  Has the prior authorization been completed?  Yes  Is there an active order (written within the past 365 days, with administrations remaining, not ) in the chart?  Yes  Patient denies any dental work involving the bone (e.g. tooth extraction or dental implants) in the past 4 weeks?  Yes  Patient denies plans for any dental work involving the bone (e.g. tooth extraction or dental implants) in the next 4 weeks? Yes    The following steps were completed to comply with the REMS program for Prolia:  Reviewed information in the Medication Guide and Patient Counseling Chart, including the serious risks of Prolia  and the symptoms of each risk.  Advised patient to seek prompt medical attention if they have signs or symptoms of any of the serious risks.  Provided each patient a copy of the Medication Guide and Patient Brochure.    Prior to injection, verified patient identity using patient's name and date of birth. Medication was administered. Please see MAR and medication order for additional information. Patient instructed to remain in clinic for 15 minutes and report any adverse reaction to staff immediately.    Vial/Syringe: Syringe  Was this medication supplied by the  patient? No  Verified that the patient has refills remaining in their prescription.    Name of provider who requested the medication administration:   Name of provider on site (faculty or community preceptor) at the time of performing the medication administration:     Date of next administration: none  Date of next office visit with provider to renew medication plan (must be seen annually): 08/28/24

## 2024-06-19 DIAGNOSIS — I10 ESSENTIAL (PRIMARY) HYPERTENSION: ICD-10-CM

## 2024-06-19 RX ORDER — LISINOPRIL 5 MG/1
TABLET ORAL
Qty: 90 TABLET | Refills: 2 | Status: SHIPPED | OUTPATIENT
Start: 2024-06-19

## 2024-06-27 ENCOUNTER — TRANSFERRED RECORDS (OUTPATIENT)
Dept: HEALTH INFORMATION MANAGEMENT | Facility: CLINIC | Age: 79
End: 2024-06-27
Payer: MEDICARE

## 2024-07-09 DIAGNOSIS — E78.00 PURE HYPERCHOLESTEROLEMIA, UNSPECIFIED: ICD-10-CM

## 2024-07-09 RX ORDER — ROSUVASTATIN CALCIUM 10 MG/1
10 TABLET, COATED ORAL DAILY
Qty: 90 TABLET | Refills: 2 | Status: SHIPPED | OUTPATIENT
Start: 2024-07-09

## 2024-07-30 ENCOUNTER — TRANSFERRED RECORDS (OUTPATIENT)
Dept: HEALTH INFORMATION MANAGEMENT | Facility: CLINIC | Age: 79
End: 2024-07-30
Payer: MEDICARE

## 2024-08-05 ENCOUNTER — TELEPHONE (OUTPATIENT)
Dept: ENDOCRINOLOGY | Facility: CLINIC | Age: 79
End: 2024-08-05
Payer: MEDICARE

## 2024-08-05 NOTE — TELEPHONE ENCOUNTER
[facility] Call Center    Phone Message    May a detailed message be left on voicemail: yes     Reason for Call: Other: Per pt would like to know if she can have her prolia injection in early OCT before she leaves for the winter in NOV. Please call pt back to discuss. Per pt last prolia shot was on 05/08/24. Per pt every 6 months she suppose to have the prolia shot, thank you!     Action Taken: Message routed to:  Other: ENDO    Travel Screening: Not Applicable     Date of Service:

## 2024-08-05 NOTE — TELEPHONE ENCOUNTER
I called the pt, she cannot have the Prolia in October, but I have scheduled her for November, when she is home for Thanksgiving.

## 2024-08-08 ENCOUNTER — ANCILLARY PROCEDURE (OUTPATIENT)
Dept: MAMMOGRAPHY | Facility: CLINIC | Age: 79
End: 2024-08-08
Attending: FAMILY MEDICINE
Payer: MEDICARE

## 2024-08-08 DIAGNOSIS — Z12.31 VISIT FOR SCREENING MAMMOGRAM: ICD-10-CM

## 2024-08-08 PROCEDURE — 77063 BREAST TOMOSYNTHESIS BI: CPT

## 2024-08-28 ENCOUNTER — OFFICE VISIT (OUTPATIENT)
Dept: ENDOCRINOLOGY | Facility: CLINIC | Age: 79
End: 2024-08-28
Payer: MEDICARE

## 2024-08-28 ENCOUNTER — HOSPITAL ENCOUNTER (OUTPATIENT)
Dept: GENERAL RADIOLOGY | Facility: HOSPITAL | Age: 79
Discharge: HOME | End: 2024-08-28
Attending: INTERNAL MEDICINE | Admitting: INTERNAL MEDICINE
Payer: MEDICARE

## 2024-08-28 VITALS
HEART RATE: 77 BPM | HEIGHT: 61 IN | OXYGEN SATURATION: 96 % | SYSTOLIC BLOOD PRESSURE: 110 MMHG | DIASTOLIC BLOOD PRESSURE: 50 MMHG | BODY MASS INDEX: 25.28 KG/M2 | WEIGHT: 133.9 LBS

## 2024-08-28 DIAGNOSIS — M81.8 OTHER OSTEOPOROSIS WITHOUT CURRENT PATHOLOGICAL FRACTURE: ICD-10-CM

## 2024-08-28 DIAGNOSIS — M81.8 OTHER OSTEOPOROSIS WITHOUT CURRENT PATHOLOGICAL FRACTURE: Primary | ICD-10-CM

## 2024-08-28 DIAGNOSIS — E04.9 GOITER: ICD-10-CM

## 2024-08-28 PROCEDURE — 72070 X-RAY EXAM THORAC SPINE 2VWS: CPT

## 2024-08-28 PROCEDURE — 99417 PROLNG OP E/M EACH 15 MIN: CPT | Performed by: INTERNAL MEDICINE

## 2024-08-28 PROCEDURE — 72100 X-RAY EXAM L-S SPINE 2/3 VWS: CPT

## 2024-08-28 PROCEDURE — 99215 OFFICE O/P EST HI 40 MIN: CPT | Performed by: INTERNAL MEDICINE

## 2024-08-28 RX ORDER — MUPIROCIN 20 MG/G
OINTMENT TOPICAL PRN
COMMUNITY
Start: 2024-07-23

## 2024-08-28 RX ORDER — FLUOROURACIL 50 MG/G
CREAM TOPICAL DAILY
COMMUNITY
Start: 2024-05-21

## 2024-08-28 NOTE — LETTER
8/28/2024      Ivis Cook  [address]  [facility] [address]      Dear Colleague,    Thank you for referring your patient, Ivis Cook, to the [facility]. Please see a copy of my visit note below.      ENDOCRINOLOGY NEW PATIENT VISIT        HISTORY OF PRESENT ILLNESS    Ivis Cook is seen to establish endocrine care.  The patient is new to me, previously followed with Dr. Ro.    Initially saw Dr. Ro in 9/2022: At that time, the patient had been initiated on Fosamax 2 years prior but had decline in BMD despite treatment.  Therefore, Dr. Ro recommended transitioning to Prolia after initial visit.    Previously, patient had diagnosis of low bone density.  Earliest DXA scan in our system was performed in 9/2009 and showed low bone density.    She saw Dr. Burden in consultation in 9/2020.  At that time, it was noted that the patient had previously been treated with Boniva for 3 years (over 15 years ago).  DXA scan in 2020 showed low bone density and FRAX calculated risk for major osteoporotic fracture was 12.7% and hip fracture 3.2% when adjusted for TBS.  Moreover, compared to prior study in 2014 there had been 11.4% decline in BMD at the left hip and 5.8% decline in BMD at the right hip.  Given these factors, Dr. Burden recommended treatment with Fosamax.    Ms. Cook actually does not recall taking Fosamax.    She had follow-up DXA scan on 8/5/2022: I reviewed these images.  -Lumbar spine excluded due to discrepant BMD.  -Left femoral neck T-score -2.0, left total hip T-score -1.3.  -Right femoral neck T-score -1.0, right total hip T-score -1.0.  -Left one third radius T-score -1.2.    This study was completed on a new scanner at [facility] and could not be compared to prior study which was done at a different location.    Most recent DXA scan was performed on 10/10/2023 at [facility].  I also reviewed these images.  -Left femoral neck T-score  -2.0, left total hip T-score -1.2.  1.7% increase in BMD at the left hip compared to prior study in 2022 (not significant).  -Right femoral neck T-score -0.5, right total hip T-score -0.9.  -Left one third radius T-score -1.4.  2% decline in BMD compared to prior study in 2022 (not significant).  WALKER on one third radius in 2023 study appears to be different compared to 2022 study.    The patient received first dose of Prolia on 11/1/2022.  She has since received injections on 5/2/2023, 11/7/2023, and 5/8/2024.  She has tolerated Prolia without side effect.    Fracture history:  -2011 XR with 5th toe fracture  -Estimates 3.25 inches height loss in adulthood.    Calcium/vitamin D intake:  - Diet - Dark leafy greens several times a week.  - Supplements - Calcium 1 tablet daily  - On PPI therapy  - Vitamin D3 unknown dose 2 capsules daily.    No history of hypercalcemia.  No history of nephrolithiasis.    No history of malabsorptive disorder; no history of GI surgery; no chronic diarrhea.    No history of long-term glucocorticoid therapy.  Approximate age at menopause: hysterectomy at age 48.    Never tobacco use.  A glass of wine 2-3 times a week.  Golfs, water aerobics, bicycles regularly.     Mother with osteoporosis.  Maternal hip fracture history.    Has regular dentist appointments. No anticipating invasive dental surgery.    Pertinent Social History: , her  was in the [employer] and they moved around for his work previously.  She is a retired .  Has 1 daughter.  [place]vez in [place] between October and April.    PAST MEDICAL HISTORY  Past Medical History:   Diagnosis Date     Basal cell carcinoma of hand 01/01/2013     Basal cell carcinoma of leg 01/01/2012     DVT (deep venous thrombosis) (H) 03/29/2018    Not seen but assumed since PE     Eosinophilic esophagitis      Generalized osteoarthritis      HPV in female      Osteopenia      Pulmonary emboli (H) 03/01/2018     Synovial cyst popliteal  "space     Left        MEDICATIONS  Current Outpatient Medications   Medication Sig Dispense Refill     BIOTIN PO biotin       Cholecalciferol (VITAMIN D3 PO) Take by mouth daily       Collagen Hydrolysate, Bovine, POWD collagen (bovine)       denosumab (PROLIA) 60 MG/ML SOSY injection Inject 1 mL (60 mg) Subcutaneous once for 1 dose 1 mL 0     esomeprazole (NEXIUM) 20 MG DR capsule Take 20 mg by mouth every morning (before breakfast) Take 30-60 minutes before eating.       fluorouracil (EFUDEX) 5 % external cream Apply topically daily.       lisinopril (ZESTRIL) 5 MG tablet TAKE 1 TABLET BY MOUTH EVERY DAY 90 tablet 2     MAGNESIUM GLUCONATE PO Take 200 mg by mouth       mupirocin (BACTROBAN) 2 % external ointment Apply topically as needed.       Omega-3 Fatty Acids (FISH OIL PO) Fish Oil       Oyster Shell (OYSTER CALCIUM PO) Take 1,000 mg by mouth daily       potassium chloride ER (MICRO-K) 10 MEQ CR capsule Take 1 tablet by mouth       rosuvastatin (CRESTOR) 10 MG tablet TAKE 1 TABLET (10 MG) BY MOUTH DAILY. 90 tablet 2     UNABLE TO FIND daily MEDICATION NAME: tumeric         Allergies, family, and social history were reviewed and documented as needed in EHR.     REVIEW OF SYSTEMS  A focused ROS was performed, with pertinent positives and negatives as noted in the HPI.    PHYSICAL EXAM  /50 (BP Location: Right arm, Patient Position: Sitting, Cuff Size: Adult Regular)   Pulse 77   Ht 1.543 m (5' 0.75\")   Wt 60.7 kg (133 lb 14.4 oz)   SpO2 96%   BMI 25.51 kg/m    Body mass index is 25.51 kg/m .  Constitutional: Vital signs reviewed, as recorded above. Patient is alert, oriented and appears in no acute distress.  Eyes: PER, EOMI, no stare, lid lag, or retraction; no conjunctival injection.  ENMT: OP clear with moist MM. Lips are without lesions.   Neck: Neck supple, thyroid is 1.5 times the size of normal, no clearly palpable nodules and no tenderness on exam.  Lymphatic: No cervical or supraclavicular " LAD.  Cardiovascular: RRR, normal S1/S2, no audible murmurs, rubs or gallops; no LE edema.  Respiratory: CTAB, without wheezes, crackles or rhonchi; normal chest wall motion and respiratory effort.  MSK: No pain with palpation over the spine.  No clubbing or cyanosis; normal muscle bulk and tone.  Skin: Normal skin color, temperature, turgor and texture.  Neurological: Alert and oriented times 3. No tremor.    DATA REVIEW  Each of the following laboratory and/or imaging studies were reviewed.    DXA scans as in HPI.            ASSESSMENT  1.  Low bone density.  With high FRAX calculated fracture risk.  Given significant height loss in adulthood, would screen for an occult vertebral fracture.  Stable BMD while on Prolia based on last DXA scan from 10/2023.  Would continue Prolia for at least one more year and reassess BMD and, if BMD remains in low bone density range, consider transitioning to IV bisphosphonate with goal for drug holiday.  Meanwhile, we will optimize calcium intake (we reviewed calcium goals) and ensure vitamin D remains optimal.  Since she cho in [place], she would like to look into the option of getting Prolia injection in [place]: She will update us if she finds an infusion center that is able to administer the medication.  If not, she plans to return to [place] for Prolia injection. Finally, we reviewed importance of consistent administration of Prolia every 6 months and avoidance of interruption or delay in doses, which could result in marked increase in bone turnover and increased risk for fracture.    2.  Low alkaline phosphatase.  Likely due to antiresorptive therapy.  Prior alkaline phosphatase in 2020 was normal, so low degree of suspicion for an underlying primary metabolic bone disorder such as hypophosphatasia.    3.  Goiter.  Normal thyroid function test in 5/2024.  Referred for thyroid ultrasound.    PLAN  -Spine x-rays today  -Labs in early October before departure to [place]     -Schedule thyroid ultrasound  -We will tentatively plan for Prolia injection in [place] but call our clinic if you find an infusion center in [place] to administer Prolia (due by 11/8/2024)  -Confirm calcium supplement is calcium citrate--aim for 500 mg twice daily   -Be mindful of serving size of calcium supplement and how many tablets are needed to get labelled dose  -Continue vitamin D3 without changes  -Return for a follow-up visit in mid October 2025--labs and DXA scan on or after 10/10/25 before visit  -We will communicate results via [employer], or if needed by phone        Orders Placed This Encounter   Procedures     XR Lumbar Spine 2/3 Views     XR Thoracic Spine 2 Views     US Thyroid     Vitamin D Deficiency     Comprehensive metabolic panel     TSH with free T4 reflex     Parathyroid Hormone Intact         I spent a total of 62 minutes on the date of encounter reviewing medical records, evaluating the patient, coordinating care and documenting in the EHR, as detailed above.      Doris Cody MD   Division of Diabetes, Endocrinology and Metabolism  Department of Medicine      cc: Addie Martinez MD           Again, thank you for allowing me to participate in the care of your patient.        Sincerely,        Doris Cody MD

## 2024-08-28 NOTE — PROGRESS NOTES
ENDOCRINOLOGY NEW PATIENT VISIT        HISTORY OF PRESENT ILLNESS    Ivis Cook is seen to establish endocrine care.  The patient is new to me, previously followed with Dr. Ro.    Initially saw Dr. Ro in 9/2022: At that time, the patient had been initiated on Fosamax 2 years prior but had decline in BMD despite treatment.  Therefore, Dr. Ro recommended transitioning to Prolia after initial visit.    Previously, patient had diagnosis of low bone density.  Earliest DXA scan in our system was performed in 9/2009 and showed low bone density.    She saw Dr. Burden in consultation in 9/2020.  At that time, it was noted that the patient had previously been treated with Boniva for 3 years (over 15 years ago).  DXA scan in 2020 showed low bone density and FRAX calculated risk for major osteoporotic fracture was 12.7% and hip fracture 3.2% when adjusted for TBS.  Moreover, compared to prior study in 2014 there had been 11.4% decline in BMD at the left hip and 5.8% decline in BMD at the right hip.  Given these factors, Dr. Burden recommended treatment with Fosamax.    Ms. Cook actually does not recall taking Fosamax.    She had follow-up DXA scan on 8/5/2022: I reviewed these images.  -Lumbar spine excluded due to discrepant BMD.  -Left femoral neck T-score -2.0, left total hip T-score -1.3.  -Right femoral neck T-score -1.0, right total hip T-score -1.0.  -Left one third radius T-score -1.2.    This study was completed on a new scanner at [facility] and could not be compared to prior study which was done at a different location.    Most recent DXA scan was performed on 10/10/2023 at [facility].  I also reviewed these images.  -Left femoral neck T-score -2.0, left total hip T-score -1.2.  1.7% increase in BMD at the left hip compared to prior study in 2022 (not significant).  -Right femoral neck T-score -0.5, right total hip T-score -0.9.  -Left one third radius T-score -1.4.  2% decline in BMD  compared to prior study in 2022 (not significant).  WALKER on one third radius in 2023 study appears to be different compared to 2022 study.    The patient received first dose of Prolia on 11/1/2022.  She has since received injections on 5/2/2023, 11/7/2023, and 5/8/2024.  She has tolerated Prolia without side effect.    Fracture history:  -2011 XR with 5th toe fracture  -Estimates 3.25 inches height loss in adulthood.    Calcium/vitamin D intake:  - Diet - Dark leafy greens several times a week.  - Supplements - Calcium 1 tablet daily  - On PPI therapy  - Vitamin D3 unknown dose 2 capsules daily.    No history of hypercalcemia.  No history of nephrolithiasis.    No history of malabsorptive disorder; no history of GI surgery; no chronic diarrhea.    No history of long-term glucocorticoid therapy.  Approximate age at menopause: hysterectomy at age 48.    Never tobacco use.  A glass of wine 2-3 times a week.  Golfs, water aerobics, bicycles regularly.     Mother with osteoporosis.  Maternal hip fracture history.    Has regular dentist appointments. No anticipating invasive dental surgery.    Pertinent Social History: , her  was in the [employer] and they moved around for his work previously.  She is a retired .  Has 1 daughter.  [place] in [place] between October and April.    PAST MEDICAL HISTORY  Past Medical History:   Diagnosis Date    Basal cell carcinoma of hand 01/01/2013    Basal cell carcinoma of leg 01/01/2012    DVT (deep venous thrombosis) (H) 03/29/2018    Not seen but assumed since PE    Eosinophilic esophagitis     Generalized osteoarthritis     HPV in female     Osteopenia     Pulmonary emboli (H) 03/01/2018    Synovial cyst popliteal space     Left        MEDICATIONS  Current Outpatient Medications   Medication Sig Dispense Refill    BIOTIN PO biotin      Cholecalciferol (VITAMIN D3 PO) Take by mouth daily      Collagen Hydrolysate, Bovine, POWD collagen (bovine)      denosumab  "(PROLIA) 60 MG/ML SOSY injection Inject 1 mL (60 mg) Subcutaneous once for 1 dose 1 mL 0    esomeprazole (NEXIUM) 20 MG DR capsule Take 20 mg by mouth every morning (before breakfast) Take 30-60 minutes before eating.      fluorouracil (EFUDEX) 5 % external cream Apply topically daily.      lisinopril (ZESTRIL) 5 MG tablet TAKE 1 TABLET BY MOUTH EVERY DAY 90 tablet 2    MAGNESIUM GLUCONATE PO Take 200 mg by mouth      mupirocin (BACTROBAN) 2 % external ointment Apply topically as needed.      Omega-3 Fatty Acids (FISH OIL PO) Fish Oil      Oyster Shell (OYSTER CALCIUM PO) Take 1,000 mg by mouth daily      potassium chloride ER (MICRO-K) 10 MEQ CR capsule Take 1 tablet by mouth      rosuvastatin (CRESTOR) 10 MG tablet TAKE 1 TABLET (10 MG) BY MOUTH DAILY. 90 tablet 2    UNABLE TO FIND daily MEDICATION NAME: tumeric         Allergies, family, and social history were reviewed and documented as needed in EHR.     REVIEW OF SYSTEMS  A focused ROS was performed, with pertinent positives and negatives as noted in the HPI.    PHYSICAL EXAM  /50 (BP Location: Right arm, Patient Position: Sitting, Cuff Size: Adult Regular)   Pulse 77   Ht 1.543 m (5' 0.75\")   Wt 60.7 kg (133 lb 14.4 oz)   SpO2 96%   BMI 25.51 kg/m    Body mass index is 25.51 kg/m .  Constitutional: Vital signs reviewed, as recorded above. Patient is alert, oriented and appears in no acute distress.  Eyes: PER, EOMI, no stare, lid lag, or retraction; no conjunctival injection.  ENMT: OP clear with moist MM. Lips are without lesions.   Neck: Neck supple, thyroid is 1.5 times the size of normal, no clearly palpable nodules and no tenderness on exam.  Lymphatic: No cervical or supraclavicular LAD.  Cardiovascular: RRR, normal S1/S2, no audible murmurs, rubs or gallops; no LE edema.  Respiratory: CTAB, without wheezes, crackles or rhonchi; normal chest wall motion and respiratory effort.  MSK: No pain with palpation over the spine.  No clubbing or " cyanosis; normal muscle bulk and tone.  Skin: Normal skin color, temperature, turgor and texture.  Neurological: Alert and oriented times 3. No tremor.    DATA REVIEW  Each of the following laboratory and/or imaging studies were reviewed.    DXA scans as in HPI.            ASSESSMENT  1.  Low bone density.  With high FRAX calculated fracture risk.  Given significant height loss in adulthood, would screen for an occult vertebral fracture.  Stable BMD while on Prolia based on last DXA scan from 10/2023.  Would continue Prolia for at least one more year and reassess BMD and, if BMD remains in low bone density range, consider transitioning to IV bisphosphonate with goal for drug holiday.  Meanwhile, we will optimize calcium intake (we reviewed calcium goals) and ensure vitamin D remains optimal.  Since she cho in [place], she would like to look into the option of getting Prolia injection in [place]: She will update us if she finds an infusion center that is able to administer the medication.  If not, she plans to return to [place] for Prolia injection. Finally, we reviewed importance of consistent administration of Prolia every 6 months and avoidance of interruption or delay in doses, which could result in marked increase in bone turnover and increased risk for fracture.    2.  Low alkaline phosphatase.  Likely due to antiresorptive therapy.  Prior alkaline phosphatase in 2020 was normal, so low degree of suspicion for an underlying primary metabolic bone disorder such as hypophosphatasia.    3.  Goiter.  Normal thyroid function test in 5/2024.  Referred for thyroid ultrasound.    PLAN  -Spine x-rays today  -Labs in early October before departure to [place]    -Schedule thyroid ultrasound  -We will tentatively plan for Prolia injection in [place] but call our clinic if you find an infusion center in [place] to administer Prolia (due by 11/8/2024)  -Confirm calcium supplement is calcium citrate--aim for 500 mg  twice daily   -Be mindful of serving size of calcium supplement and how many tablets are needed to get labelled dose  -Continue vitamin D3 without changes  -Return for a follow-up visit in mid October 2025--labs and DXA scan on or after 10/10/25 before visit  -We will communicate results via [employer], or if needed by phone        Orders Placed This Encounter   Procedures    XR Lumbar Spine 2/3 Views    XR Thoracic Spine 2 Views    US Thyroid    Vitamin D Deficiency    Comprehensive metabolic panel    TSH with free T4 reflex    Parathyroid Hormone Intact         I spent a total of 62 minutes on the date of encounter reviewing medical records, evaluating the patient, coordinating care and documenting in the EHR, as detailed above.      Doris Cody MD   Division of Diabetes, Endocrinology and Metabolism  Department of Medicine      cc: Addie Martinez MD

## 2024-08-28 NOTE — PATIENT INSTRUCTIONS
-Spine x-rays today  -Labs in early October before departure to [place]    -Schedule thyroid ultrasound  -We will tentatively plan for Prolia injection in [place] but call our clinic if you find an infusion center in [place] to administer Prolia (due by 11/8/2024)  -Confirm calcium supplement is calcium citrate--aim for 500 mg twice daily   -Be mindful of serving size of calcium supplement and how many tablets are needed to get labelled dose  -Continue vitamin D3 without changes  -Return for a follow-up visit in mid October 2025--labs and DXA scan on or after 10/10/25 before visit  -We will communicate results via [employer], or if needed by phone

## 2024-08-29 ENCOUNTER — HOSPITAL ENCOUNTER (OUTPATIENT)
Dept: ULTRASOUND IMAGING | Facility: HOSPITAL | Age: 79
Discharge: HOME | End: 2024-08-29
Attending: INTERNAL MEDICINE | Admitting: INTERNAL MEDICINE
Payer: MEDICARE

## 2024-08-29 DIAGNOSIS — E04.9 GOITER: ICD-10-CM

## 2024-08-29 PROCEDURE — 76536 US EXAM OF HEAD AND NECK: CPT

## 2024-09-05 ENCOUNTER — TELEPHONE (OUTPATIENT)
Dept: ENDOCRINOLOGY | Facility: CLINIC | Age: 79
End: 2024-09-05
Payer: MEDICARE

## 2024-09-05 NOTE — TELEPHONE ENCOUNTER
I called the pt and reviewed her images. Xrays show arthritis, no fractures, thyroid shows nodules, that are benign and do not meet criteria for bx. I will have the MD review and  advise further.

## 2024-09-05 NOTE — TELEPHONE ENCOUNTER
[facility] Call Center    Phone Message    May a detailed message be left on voicemail: yes     Reason for Call: Other: Ppt would like a call back from the team to go over her lab and US results with a plan of care. Please call pt back thank you!     Action Taken: Message routed to:  [facility] ([facility] ENDO    Travel Screening: Not Applicable     Date of Service:

## 2024-09-06 NOTE — TELEPHONE ENCOUNTER
-Thoracic and lumbar spine x-rays show degenerative changes (wear and tear changes) but no vertebral fracture.  -Thyroid ultrasound shows multiple thyroid nodules in left and right lobes of thyroid: These have benign features that do not warrant any immediate intervention.  -Labs as scheduled on 10/8/2024, with Prolia injection in November (we will tentatively plan for injection in [place], she should let us know if she plans to get this in [place])  -Follow-up with me in October 2025 as discussed at our endocrinology visit

## 2024-10-01 ENCOUNTER — TRANSFERRED RECORDS (OUTPATIENT)
Dept: HEALTH INFORMATION MANAGEMENT | Facility: CLINIC | Age: 79
End: 2024-10-01
Payer: MEDICARE

## 2024-10-10 ENCOUNTER — LAB (OUTPATIENT)
Dept: LAB | Facility: CLINIC | Age: 79
End: 2024-10-10
Payer: MEDICARE

## 2024-10-10 DIAGNOSIS — M81.8 OTHER OSTEOPOROSIS WITHOUT CURRENT PATHOLOGICAL FRACTURE: ICD-10-CM

## 2024-10-10 LAB
ALBUMIN SERPL BCG-MCNC: 4.7 G/DL (ref 3.5–5.2)
ALP SERPL-CCNC: 37 U/L (ref 40–150)
ALT SERPL W P-5'-P-CCNC: 24 U/L (ref 0–50)
ANION GAP SERPL CALCULATED.3IONS-SCNC: 16 MMOL/L (ref 7–15)
AST SERPL W P-5'-P-CCNC: 25 U/L (ref 0–45)
BILIRUB SERPL-MCNC: 0.3 MG/DL
BUN SERPL-MCNC: 12.1 MG/DL (ref 8–23)
CALCIUM SERPL-MCNC: 9.9 MG/DL (ref 8.8–10.4)
CHLORIDE SERPL-SCNC: 102 MMOL/L (ref 98–107)
CREAT SERPL-MCNC: 0.7 MG/DL (ref 0.51–0.95)
EGFRCR SERPLBLD CKD-EPI 2021: 86 ML/MIN/1.73M2
GLUCOSE SERPL-MCNC: 77 MG/DL (ref 70–99)
HCO3 SERPL-SCNC: 24 MMOL/L (ref 22–29)
PHOSPHATE SERPL-MCNC: 4.7 MG/DL (ref 2.5–4.5)
POTASSIUM SERPL-SCNC: 4.2 MMOL/L (ref 3.4–5.3)
PROT SERPL-MCNC: 7.5 G/DL (ref 6.4–8.3)
PTH-INTACT SERPL-MCNC: 30 PG/ML (ref 15–65)
SODIUM SERPL-SCNC: 142 MMOL/L (ref 135–145)
TSH SERPL DL<=0.005 MIU/L-ACNC: 1.05 UIU/ML (ref 0.3–4.2)
VIT D+METAB SERPL-MCNC: 27 NG/ML (ref 20–50)

## 2024-10-10 PROCEDURE — 84443 ASSAY THYROID STIM HORMONE: CPT

## 2024-10-10 PROCEDURE — 80053 COMPREHEN METABOLIC PANEL: CPT

## 2024-10-10 PROCEDURE — 36415 COLL VENOUS BLD VENIPUNCTURE: CPT

## 2024-10-10 PROCEDURE — 84100 ASSAY OF PHOSPHORUS: CPT

## 2024-10-10 PROCEDURE — 83970 ASSAY OF PARATHORMONE: CPT

## 2024-10-10 PROCEDURE — 82306 VITAMIN D 25 HYDROXY: CPT

## 2024-10-14 ENCOUNTER — MYC MEDICAL ADVICE (OUTPATIENT)
Dept: ENDOCRINOLOGY | Facility: CLINIC | Age: 79
End: 2024-10-14
Payer: MEDICARE

## 2024-10-14 DIAGNOSIS — M81.8 OTHER OSTEOPOROSIS WITHOUT CURRENT PATHOLOGICAL FRACTURE: Primary | ICD-10-CM

## 2024-10-25 ENCOUNTER — TELEPHONE (OUTPATIENT)
Dept: ENDOCRINOLOGY | Facility: CLINIC | Age: 79
End: 2024-10-25
Payer: MEDICARE

## 2024-10-25 DIAGNOSIS — M81.8 OTHER OSTEOPOROSIS WITHOUT CURRENT PATHOLOGICAL FRACTURE: Primary | ICD-10-CM

## 2024-10-25 NOTE — TELEPHONE ENCOUNTER
Pt called and requested a message be sent. Pt stated Dr. Cody wanted to know the Vitamin D the pt is taking. Pt stated she is taking Vitamin D3 2000 I U or 50mcg

## 2024-11-12 ENCOUNTER — TELEPHONE (OUTPATIENT)
Dept: ENDOCRINOLOGY | Facility: CLINIC | Age: 79
End: 2024-11-12
Payer: MEDICARE

## 2024-11-12 NOTE — TELEPHONE ENCOUNTER
Noted, thank you.  Please ask patient to increase vitamin D3 to 4000 IU daily.  She should take it with food to maximize its absorption.    She can move forward with the additional labs I requested as soon as she is able.    We also had plan for follow-up in mid October 2025, with labs and DXA before visit.  Our scheduling team will likely be reaching out to her in the coming weeks.

## 2024-11-12 NOTE — TELEPHONE ENCOUNTER
[facility] Call Center    Phone Message    May a detailed message be left on voicemail: yes     Reason for Call: Other: patient wanting to know if she should do labs before her prolia injection on 11/20/2024. Please call her back to discuss

## 2024-11-20 ENCOUNTER — ALLIED HEALTH/NURSE VISIT (OUTPATIENT)
Dept: ENDOCRINOLOGY | Facility: CLINIC | Age: 79
End: 2024-11-20
Payer: MEDICARE

## 2024-11-20 ENCOUNTER — LAB (OUTPATIENT)
Dept: LAB | Facility: CLINIC | Age: 79
End: 2024-11-20
Payer: MEDICARE

## 2024-11-20 DIAGNOSIS — M81.8 OTHER OSTEOPOROSIS WITHOUT CURRENT PATHOLOGICAL FRACTURE: ICD-10-CM

## 2024-11-20 DIAGNOSIS — M81.8 OTHER OSTEOPOROSIS WITHOUT CURRENT PATHOLOGICAL FRACTURE: Primary | ICD-10-CM

## 2024-11-20 PROCEDURE — 84207 ASSAY OF VITAMIN B-6: CPT | Mod: 90

## 2024-11-20 PROCEDURE — 84080 ASSAY ALKALINE PHOSPHATASES: CPT | Mod: 90

## 2024-11-20 PROCEDURE — 99207 PR NO CHARGE NURSE ONLY: CPT

## 2024-11-20 PROCEDURE — 36415 COLL VENOUS BLD VENIPUNCTURE: CPT

## 2024-11-20 NOTE — PROGRESS NOTES
Clinic Administered Medication Documentation      Prolia Documentation    Indication: Prolia  (denosumab) is a prescription medicine used to treat osteoporosis in patients who:   Are at high risk for fracture, meaning patients who have had a fracture related to osteoporosis, or who have multiple risk factors for fracture.  Cannot use another osteoporosis medicine or other osteoporosis medicines did not work well.  The timeline for early/late injections would be 4 weeks early and any time after the 6 month ana lilia. If a patient receives their injection late, then the subsequent injection would be 6 months from the date that they actually received the injection.    When was the last injection?  24  Was the last injection at least 6 months ago? Yes  Has the prior authorization been completed?  Yes  Is there an active order (written within the past 365 days, with administrations remaining, not ) in the chart?  Yes   GFR Estimate   Date Value Ref Range Status   10/10/2024 86 >60 mL/min/1.73m2 Final     Comment:     eGFR calculated using  CKD-EPI equation.   2020 >60 >60 mL/min/1.73m2 Final     Has patient had a GFR within the last 12 months? Yes   Is GFR under 30, or patient has a diagnosis of CKD4 or CKD5? No   Patient denies gastric bypass or parathyroid surgery in past 6 months? Yes - patient denies.   Patient denies dental work in the past two months involving drilling into the bone, such as implants/extractions, oral surgery or a tooth extraction that has not healed yet?  Yes  Patient denies plans for an emergency tooth extraction within the next week? Yes    The following steps were completed to comply with the REMS program for Prolia:  Reviewed information in the Medication Guide, including the serious risks of Prolia  and the symptoms of each risk.  Advised patient to seek prompt medical attention if they have signs or symptoms of any of the serious risks.  Provided each patient a copy of the  Medication Guide and Patient Guide.    Prior to injection, verified patient identity using patient's name and date of birth. Medication was administered. Please see MAR and medication order for additional information. Patient instructed to remain in clinic for 15 minutes and report any adverse reaction to staff immediately.    Vial/Syringe: Syringe  Was this medication supplied by the patient? No  Verified that the patient has administrations remaining in their prescription.      Ivis Cook comes into clinic today at the request of Dr Cody Ordering Provider for Med Injection only Prolia .        This service provided today was under the supervising provider of the day Dr Cody, who was available if needed.    Breann Lua RN

## 2024-11-21 LAB — ALP BONE SERPL-MCNC: 6.8 UG/L

## 2024-11-23 LAB — PYRIDOXAL PHOS SERPL-SCNC: 59 NMOL/L

## 2024-12-05 ENCOUNTER — MYC MEDICAL ADVICE (OUTPATIENT)
Dept: ENDOCRINOLOGY | Facility: CLINIC | Age: 79
End: 2024-12-05
Payer: MEDICARE

## 2024-12-05 DIAGNOSIS — M81.8 OTHER OSTEOPOROSIS WITHOUT CURRENT PATHOLOGICAL FRACTURE: Primary | ICD-10-CM

## 2024-12-09 ENCOUNTER — TRANSFERRED RECORDS (OUTPATIENT)
Dept: HEALTH INFORMATION MANAGEMENT | Facility: CLINIC | Age: 79
End: 2024-12-09
Payer: MEDICARE

## 2025-03-19 ENCOUNTER — PATIENT OUTREACH (OUTPATIENT)
Dept: CARE COORDINATION | Facility: CLINIC | Age: 80
End: 2025-03-19
Payer: MEDICARE

## 2025-04-10 ENCOUNTER — TRANSFERRED RECORDS (OUTPATIENT)
Dept: HEALTH INFORMATION MANAGEMENT | Facility: CLINIC | Age: 80
End: 2025-04-10
Payer: MEDICARE

## 2025-04-14 DIAGNOSIS — M89.9 DISORDER OF BONE AND CARTILAGE: ICD-10-CM

## 2025-04-14 DIAGNOSIS — Z79.83 GASTROINTESTINAL INTOLERANCE TO BISPHOSPHONATES: ICD-10-CM

## 2025-04-14 DIAGNOSIS — M81.8 OTHER OSTEOPOROSIS WITHOUT CURRENT PATHOLOGICAL FRACTURE: ICD-10-CM

## 2025-04-14 DIAGNOSIS — K90.49 GASTROINTESTINAL INTOLERANCE TO BISPHOSPHONATES: ICD-10-CM

## 2025-04-14 DIAGNOSIS — K20.0 EOSINOPHILIC ESOPHAGITIS: Primary | ICD-10-CM

## 2025-04-14 DIAGNOSIS — M94.9 DISORDER OF BONE AND CARTILAGE: ICD-10-CM

## 2025-05-01 ENCOUNTER — VIRTUAL VISIT (OUTPATIENT)
Dept: FAMILY MEDICINE | Facility: CLINIC | Age: 80
End: 2025-05-01
Payer: MEDICARE

## 2025-05-01 DIAGNOSIS — Z85.828 HISTORY OF BASAL CELL CARCINOMA: ICD-10-CM

## 2025-05-01 DIAGNOSIS — R93.89 ABNORMAL FINDINGS ON DIAGNOSTIC IMAGING OF OTHER SPECIFIED BODY STRUCTURES: ICD-10-CM

## 2025-05-01 DIAGNOSIS — I10 BENIGN ESSENTIAL HYPERTENSION: ICD-10-CM

## 2025-05-01 DIAGNOSIS — Z12.31 VISIT FOR SCREENING MAMMOGRAM: ICD-10-CM

## 2025-05-01 DIAGNOSIS — G89.29 CHRONIC RIGHT SHOULDER PAIN: ICD-10-CM

## 2025-05-01 DIAGNOSIS — R79.9 ABNORMAL FINDING OF BLOOD CHEMISTRY, UNSPECIFIED: ICD-10-CM

## 2025-05-01 DIAGNOSIS — E55.9 VITAMIN D DEFICIENCY: ICD-10-CM

## 2025-05-01 DIAGNOSIS — Z00.00 ENCOUNTER FOR ANNUAL WELLNESS EXAM IN MEDICARE PATIENT: Primary | ICD-10-CM

## 2025-05-01 DIAGNOSIS — M81.0 AGE-RELATED OSTEOPOROSIS WITHOUT CURRENT PATHOLOGICAL FRACTURE: ICD-10-CM

## 2025-05-01 DIAGNOSIS — E78.00 PURE HYPERCHOLESTEROLEMIA: ICD-10-CM

## 2025-05-01 DIAGNOSIS — M25.511 CHRONIC RIGHT SHOULDER PAIN: ICD-10-CM

## 2025-05-01 SDOH — HEALTH STABILITY: PHYSICAL HEALTH: ON AVERAGE, HOW MANY MINUTES DO YOU ENGAGE IN EXERCISE AT THIS LEVEL?: 20 MIN

## 2025-05-01 SDOH — HEALTH STABILITY: PHYSICAL HEALTH: ON AVERAGE, HOW MANY DAYS PER WEEK DO YOU ENGAGE IN MODERATE TO STRENUOUS EXERCISE (LIKE A BRISK WALK)?: 5 DAYS

## 2025-05-01 ASSESSMENT — SOCIAL DETERMINANTS OF HEALTH (SDOH): HOW OFTEN DO YOU GET TOGETHER WITH FRIENDS OR RELATIVES?: MORE THAN THREE TIMES A WEEK

## 2025-05-01 NOTE — PROGRESS NOTES
Preventive Care Visit  [facility]  Addie Martinez MD, Family Medicine  May 1, 2025    Ivis is a 79 year old who is being evaluated via a billable video visit.    How would you like to obtain your AVS? Mail a copy  If the video visit is dropped, the invitation should be resent by: Text to cell phone: [phone]  Will anyone else be joining your video visit? No      Assessment & Plan       ICD-10-CM    1. Encounter for annual wellness exam in Medicare patient  Z00.00       2. Chronic right shoulder pain  M25.511 Orthopedic  Referral    G89.29       3. Vitamin D deficiency  E55.9       4. Age-related osteoporosis without current pathological fracture  M81.0 DX Bone Density     TSH with free T4 reflex      5. History of basal cell carcinoma  Z85.828       6. Benign essential hypertension  I10 Comprehensive metabolic panel (BMP + Alb, Alk Phos, ALT, AST, Total. Bili, TP)     CBC with platelets      7. Visit for screening mammogram  Z12.31 MA Screen Bilateral w/Ananda      8. Serum Total Cholesterol Was Elevated  E78.00 Lipid panel reflex to direct LDL Fasting     Hemoglobin A1c      9. Abnormal findings on diagnostic imaging of other specified body structures  R93.89 TSH with free T4 reflex      10. Abnormal finding of blood chemistry, unspecified  R79.9 Hemoglobin A1c        Mammogram ordered and due 8-8-25.    Lab appointment at the [facility] on May 16, they can draw Dr. Martinez's labs as well.    When you have your lab appointment please ask them to check you blood pressure.    Sees Dermatology for full body skin check with history of skin cancer.    Dexa scan due and ordered for 10-10-25.    Seeing endocrinology for osteoporosis and Prolia shots and stable.    The longitudinal plan of care for the diagnosis(es)/condition(s) as documented were addressed during this visit. Due to the added complexity in care, I will continue to support Ivis in the subsequent  "management and with ongoing continuity of care.  Helping to manage hypertension.    Patient has been advised of split billing requirements and indicates understanding: Yes        BMI  Estimated body mass index is 25.51 kg/m  as calculated from the following:    Height as of 8/28/24: 1.543 m (5' 0.75\").    Weight as of 8/28/24: 60.7 kg (133 lb 14.4 oz).       Counseling  Appropriate preventive services were addressed with this patient via screening, questionnaire, or discussion as appropriate for fall prevention, nutrition, physical activity, Tobacco-use cessation, social engagement, weight loss and cognition.  Checklist reviewing preventive services available has been given to the patient.  Reviewed patient's diet, addressing concerns and/or questions.           Norris Langston is a 79 year old, presenting for the following:  Physical (Annual Wellness )        5/1/2025     5:31 PM   Additional Questions   Roomed by Aliyah DICK CMA         Video Start Time:  6:00 pm      HPI           Chronic Right shoulder pain:  To see ortho and needs referral.  Would like to see [facility].    Hypertension:  Does not know recent BP.  She is currently on blood pressure medication.  No chest pain or pressure with exercise.    Hyperlipidemia: On rosuvastatin.    Osteoporosis:  Sees endocrinology and is on Prolia.    Social: Stressful winter in [place], her gentleman friend fell 4 times and she was the caretaker.  He has gone back to [place].  She is golfing and playing a lot of bridge and a couple exercise groups for arthritis. [place] in [place].  Does swim 3 times a week.     Advance Care Planning    Patient states has Health Care Directive and will send to [employer].        5/1/2025   General Health   How would you rate your overall physical health? Good   Feel stress (tense, anxious, or unable to sleep) Only a little   (!) STRESS CONCERN      5/1/2025   Nutrition   Diet: Regular (no restrictions)         5/1/2025   Exercise "   Days per week of moderate/strenous exercise 5 days   Average minutes spent exercising at this level 20 min         5/1/2025   Social Factors   Frequency of gathering with friends or relatives More than three times a week   Worry food won't last until get money to buy more No   Food not last or not have enough money for food? No   Do you have housing? (Housing is defined as stable permanent housing and does not include staying outside in a car, in a tent, in an abandoned building, in an overnight shelter, or couch-surfing.) Yes   Are you worried about losing your housing? No   Lack of transportation? No   Unable to get utilities (heat,electricity)? No         5/1/2025   Fall Risk   Fallen 2 or more times in the past year? No   Trouble with walking or balance? No          5/1/2025   Activities of Daily Living- Home Safety   Needs help with the following daily activites None of the above   Safety concerns in the home None of the above         5/1/2025   Dental   Dentist two times every year? Yes         5/1/2025   Hearing Screening   Hearing concerns? None of the above         5/1/2025   Driving Risk Screening   Patient/family members have concerns about driving No         5/1/2025   General Alertness/Fatigue Screening   Have you been more tired than usual lately? No         5/1/2025   Urinary Incontinence Screening   Bothered by leaking urine in past 6 months No           Today's PHQ-2 Score:       4/18/2024     5:23 PM   PHQ-2 ( 1999 Pf[employer])   Q1: Little interest or pleasure in doing things 0    Q2: Feeling down, depressed or hopeless 0    PHQ-2 Score 0   Q1: Little interest or pleasure in doing things Not at all   Q2: Feeling down, depressed or hopeless Not at all   PHQ-2 Score 0       Proxy-reported         5/1/2025   Substance Use   Alcohol more than 3/day or more than 7/wk No   Do you have a current opioid prescription? No   How severe/bad is pain from 1 to 10? 4/10   Do you use any other substances  recreationally? No     Social History     Tobacco Use    Smoking status: Never     Passive exposure: Past    Smokeless tobacco: Never   Substance Use Topics    Alcohol use: Yes     Alcohol/week: 3.3 standard drinks of alcohol     Comment: Alcoholic Drinks/day: Occ    Drug use: No           8/8/2024   LAST FHS-7 RESULTS   1st degree relative breast or ovarian cancer No   Any relative bilateral breast cancer No   Any male have breast cancer No   Any ONE woman have BOTH breast AND ovarian cancer No   Any woman with breast cancer before 50yrs No   2 or more relatives with breast AND/OR ovarian cancer No   2 or more relatives with breast AND/OR bowel cancer No        Mammogram Screening - After age 74- determine frequency with patient based on health status, life expectancy and patient goals              Reviewed and updated as needed this visit by Provider     Meds                  Current providers sharing in care for this patient include:  Patient Care Team:  Addie Martinez MD as PCP - General (Family Medicine)  Addie Martinez MD as Assigned PCP  Doris Cody MD as MD (Endocrinology, Diabetes, and Metabolism)  Orthopedics, [facility] (Dermatology)  Doris Cody MD as Assigned Endocrinology Provider    The following health maintenance items are reviewed in Epic and correct as of today:  Health Maintenance   Topic Date Due    URINE DRUG SCREEN  NA    ANNUAL REVIEW OF HM ORDERS  Today    PHQ-2 (once per calendar year)  Today    COVID-19 Vaccine (9 - 2024-25 season) 03/01/2025    MEDICARE ANNUAL WELLNESS VISIT  Today    LIPID  Ordered    BMP  Ordered    DEXA  10/10/2025, ordered    FALL RISK ASSESSMENT  Today    ADVANCE CARE PLANNING  We radha like a copy please    DTAP/TDAP/TD IMMUNIZATION (3 - Td or Tdap) 07/29/2031    INFLUENZA VACCINE  Completed    Pneumococcal Vaccine: 50+ Years  Completed    ZOSTER IMMUNIZATION  Completed    RSV VACCINE  Completed    HPV IMMUNIZATION   "Aged Out    MENINGITIS IMMUNIZATION  Aged Out    COLORECTAL CANCER SCREENING  Discontinued            Objective    Exam  There were no vitals taken for this visit.   Estimated body mass index is 25.51 kg/m  as calculated from the following:    Height as of 8/28/24: 1.543 m (5' 0.75\").    Weight as of 8/28/24: 60.7 kg (133 lb 14.4 oz).    Physical Exam  GENERAL: alert and no distress          4/18/2024   Mini Cog   3 Item Recall 3 objects recalled            Video-Visit Details    Type of service:  Video Visit   Video End Time: 6:30 PM  Originating Location (pt. Location): Home    Distant Location (provider location):  On-site  Platform used for Video Visit: [employer]  Signed Electronically by: Addie Martinez MD    "

## 2025-05-01 NOTE — PATIENT INSTRUCTIONS
Mammogram ordered and due 8-8-25.    Lab appointment at the [facility] on May 16, they can draw Dr. Martinez's labs as well.    When you have your lab appointment please ask them to check you blood pressure.    Sees Dermatology for full body skin check with history of skin cancer.    Dexa scan due and ordered for 10-10-25.    Seeing endocrinology for osteoporosis and Prolia shots and stable.      Health Maintenance   Topic Date Due    URINE DRUG SCREEN  NA    ANNUAL REVIEW OF HM ORDERS  Today    PHQ-2 (once per calendar year)  Today    COVID-19 Vaccine (9 - 2024-25 season) 03/01/2025    MEDICARE ANNUAL WELLNESS VISIT  Today    LIPID  Ordered    BMP  Ordered    DEXA  10/10/2025, ordered    FALL RISK ASSESSMENT  Today    ADVANCE CARE PLANNING  We radha like a copy please    DTAP/TDAP/TD IMMUNIZATION (3 - Td or Tdap) 07/29/2031    INFLUENZA VACCINE  Completed    Pneumococcal Vaccine: 50+ Years  Completed    ZOSTER IMMUNIZATION  Completed    RSV VACCINE  Completed    HPV IMMUNIZATION  Aged Out    MENINGITIS IMMUNIZATION  Aged Out    COLORECTAL CANCER SCREENING  Discontinued

## 2025-05-05 ENCOUNTER — PATIENT OUTREACH (OUTPATIENT)
Dept: CARE COORDINATION | Facility: CLINIC | Age: 80
End: 2025-05-05
Payer: MEDICARE

## 2025-05-12 ENCOUNTER — TRANSFERRED RECORDS (OUTPATIENT)
Dept: HEALTH INFORMATION MANAGEMENT | Facility: CLINIC | Age: 80
End: 2025-05-12
Payer: MEDICARE

## 2025-05-16 ENCOUNTER — RESULTS FOLLOW-UP (OUTPATIENT)
Dept: FAMILY MEDICINE | Facility: CLINIC | Age: 80
End: 2025-05-16

## 2025-05-16 DIAGNOSIS — M81.0 AGE-RELATED OSTEOPOROSIS WITHOUT CURRENT PATHOLOGICAL FRACTURE: Primary | ICD-10-CM

## 2025-05-19 ENCOUNTER — TRANSFERRED RECORDS (OUTPATIENT)
Dept: HEALTH INFORMATION MANAGEMENT | Facility: CLINIC | Age: 80
End: 2025-05-19
Payer: MEDICARE

## 2025-05-20 NOTE — RESULT ENCOUNTER NOTE
Patient updated by [facility] message with lab results.       Nash Ibanez, additional labs have returned.  Your metabolic panel is normal.  Your cholesterol labs are stable from last check.  Thyroid normal as well.  Please reach out with any follow-up questions or concerns.  Ashanti Gamez, DO

## 2025-05-21 ENCOUNTER — ALLIED HEALTH/NURSE VISIT (OUTPATIENT)
Dept: ENDOCRINOLOGY | Facility: CLINIC | Age: 80
End: 2025-05-21
Payer: MEDICARE

## 2025-05-21 DIAGNOSIS — M81.8 OTHER OSTEOPOROSIS WITHOUT CURRENT PATHOLOGICAL FRACTURE: Primary | ICD-10-CM

## 2025-05-21 PROCEDURE — 96372 THER/PROPH/DIAG INJ SC/IM: CPT | Performed by: INTERNAL MEDICINE

## 2025-05-21 PROCEDURE — 99207 PR NO CHARGE NURSE ONLY: CPT

## 2025-05-21 NOTE — PROGRESS NOTES
Clinic Administered Medication Documentation      Prolia Documentation    Indication: Prolia  (denosumab) is a prescription medicine used to treat osteoporosis in patients who:   Are at high risk for fracture, meaning patients who have had a fracture related to osteoporosis, or who have multiple risk factors for fracture.  Cannot use another osteoporosis medicine or other osteoporosis medicines did not work well.  The timeline for early/late injections would be 4 weeks early and any time after the 6 month ana lilia. If a patient receives their injection late, then the subsequent injection would be 6 months from the date that they actually received the injection.    When was the last injection?  2024  Was the last injection at least 6 months ago? Yes  Has the prior authorization been completed?  Yes  Is there an active order (written within the past 365 days, with administrations remaining, not ) in the chart?  Yes   Calcium   Date Value Ref Range Status   2025 10.4 8.8 - 10.4 mg/dL Final   2025 10.4 8.8 - 10.4 mg/dL Final     Has patient had a Calcium test in the last 12 months? Yes   Is the calcium result 8.8 or above? Yes   GFR Estimate   Date Value Ref Range Status   2025 89 >60 mL/min/1.73m2 Final     Comment:     eGFR calculated using  CKD-EPI equation.   2020 >60 >60 mL/min/1.73m2 Final     Has patient had a GFR within the last 12 months? Yes   Is GFR under 30, or patient has a diagnosis of CKD4 or CKD5? Yes   Was the Calcium test done after last Prolia injection? Yes   Is there a calcium order for 1 month post Prolia injection? Yes. Schedule patient for Calcium lab in next month.   Patient denies gastric bypass or parathyroid surgery in past 6 months? Yes - patient denies.   Patient denies undergoing any dental procedures involving drilling into the bone, such as implants, extractions, or oral surgery, within the past two months that have not yet healed?  Yes - patient  denies  Patient denies plans for an emergency tooth extraction within the next week? Yes - Patient denies     The following steps were completed to comply with the REMS program for Prolia:  Reviewed information in the Medication Guide, including the serious risks of Prolia  and the symptoms of each risk.  Advised patient to seek prompt medical attention if they have signs or symptoms of any of the serious risks.  Provided each patient a copy of the Medication Guide and Patient Guide.    Prior to injection, verified patient identity using patient's name and date of birth. Medication was administered. Please see MAR and medication order for additional information. Patient instructed to remain in clinic for 15 minutes and report any adverse reaction to staff immediately.    Vial/Syringe: Syringe  Was this medication supplied by the patient? No  Verified that the patient has administrations remaining in their prescription.    Name of provider who requested the medication administration: Dr. Cody  Name of provider on site (faculty or community preceptor) at the time of performing the medication administration: Katarzyna Carbone    Date of next administration: 12/3/2025  Date of next office visit with provider to renew medication plan (must be seen annually): 10/22/2025    Misti Stephens RN

## 2025-05-22 ENCOUNTER — TRANSFERRED RECORDS (OUTPATIENT)
Dept: HEALTH INFORMATION MANAGEMENT | Facility: CLINIC | Age: 80
End: 2025-05-22
Payer: MEDICARE

## 2025-06-07 ENCOUNTER — HEALTH MAINTENANCE LETTER (OUTPATIENT)
Age: 80
End: 2025-06-07

## 2025-06-13 ENCOUNTER — TRANSFERRED RECORDS (OUTPATIENT)
Dept: HEALTH INFORMATION MANAGEMENT | Facility: CLINIC | Age: 80
End: 2025-06-13
Payer: MEDICARE

## 2025-08-11 ENCOUNTER — ANCILLARY PROCEDURE (OUTPATIENT)
Dept: MAMMOGRAPHY | Facility: CLINIC | Age: 80
End: 2025-08-11
Attending: FAMILY MEDICINE
Payer: MEDICARE

## 2025-08-11 DIAGNOSIS — Z12.31 VISIT FOR SCREENING MAMMOGRAM: ICD-10-CM

## 2025-08-11 PROCEDURE — 77063 BREAST TOMOSYNTHESIS BI: CPT

## 2025-08-12 ENCOUNTER — TRANSFERRED RECORDS (OUTPATIENT)
Dept: HEALTH INFORMATION MANAGEMENT | Facility: CLINIC | Age: 80
End: 2025-08-12
Payer: MEDICARE

## 2025-08-19 ENCOUNTER — TRANSFERRED RECORDS (OUTPATIENT)
Dept: HEALTH INFORMATION MANAGEMENT | Facility: CLINIC | Age: 80
End: 2025-08-19
Payer: MEDICARE

## 2025-08-26 ENCOUNTER — TRANSFERRED RECORDS (OUTPATIENT)
Dept: HEALTH INFORMATION MANAGEMENT | Facility: CLINIC | Age: 80
End: 2025-08-26
Payer: MEDICARE

## 2025-09-03 DIAGNOSIS — E78.00 PURE HYPERCHOLESTEROLEMIA, UNSPECIFIED: ICD-10-CM

## 2025-09-03 RX ORDER — ROSUVASTATIN CALCIUM 10 MG/1
10 TABLET, COATED ORAL DAILY
Qty: 90 TABLET | Refills: 1 | Status: SHIPPED | OUTPATIENT
Start: 2025-09-03